# Patient Record
Sex: MALE | Race: WHITE | NOT HISPANIC OR LATINO | Employment: FULL TIME | URBAN - METROPOLITAN AREA
[De-identification: names, ages, dates, MRNs, and addresses within clinical notes are randomized per-mention and may not be internally consistent; named-entity substitution may affect disease eponyms.]

---

## 2019-01-23 ENCOUNTER — APPOINTMENT (OUTPATIENT)
Dept: RADIOLOGY | Facility: CLINIC | Age: 44
End: 2019-01-23
Payer: OTHER MISCELLANEOUS

## 2019-01-23 ENCOUNTER — APPOINTMENT (OUTPATIENT)
Dept: URGENT CARE | Facility: CLINIC | Age: 44
End: 2019-01-23
Payer: OTHER MISCELLANEOUS

## 2019-01-23 ENCOUNTER — TRANSCRIBE ORDERS (OUTPATIENT)
Dept: URGENT CARE | Facility: CLINIC | Age: 44
End: 2019-01-23
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
## 2019-01-23 PROCEDURE — 99283 EMERGENCY DEPT VISIT LOW MDM: CPT | Performed by: PHYSICIAN ASSISTANT

## 2019-01-23 PROCEDURE — 73564 X-RAY EXAM KNEE 4 OR MORE: CPT

## 2019-01-23 PROCEDURE — G0382 LEV 3 HOSP TYPE B ED VISIT: HCPCS | Performed by: PHYSICIAN ASSISTANT

## 2019-01-23 PROCEDURE — 72100 X-RAY EXAM L-S SPINE 2/3 VWS: CPT

## 2019-01-23 PROCEDURE — 73610 X-RAY EXAM OF ANKLE: CPT

## 2019-01-23 PROCEDURE — 73630 X-RAY EXAM OF FOOT: CPT

## 2019-01-26 ENCOUNTER — APPOINTMENT (OUTPATIENT)
Dept: URGENT CARE | Facility: CLINIC | Age: 44
End: 2019-01-26
Payer: OTHER MISCELLANEOUS

## 2019-01-26 PROCEDURE — 99213 OFFICE O/P EST LOW 20 MIN: CPT | Performed by: PHYSICIAN ASSISTANT

## 2019-02-04 ENCOUNTER — APPOINTMENT (OUTPATIENT)
Dept: URGENT CARE | Facility: CLINIC | Age: 44
End: 2019-02-04
Payer: OTHER MISCELLANEOUS

## 2019-02-04 PROCEDURE — 99213 OFFICE O/P EST LOW 20 MIN: CPT | Performed by: EMERGENCY MEDICINE

## 2019-02-07 ENCOUNTER — OFFICE VISIT (OUTPATIENT)
Dept: FAMILY MEDICINE CLINIC | Facility: CLINIC | Age: 44
End: 2019-02-07
Payer: COMMERCIAL

## 2019-02-07 VITALS
WEIGHT: 228 LBS | BODY MASS INDEX: 30.22 KG/M2 | OXYGEN SATURATION: 96 % | DIASTOLIC BLOOD PRESSURE: 80 MMHG | SYSTOLIC BLOOD PRESSURE: 120 MMHG | HEIGHT: 73 IN | HEART RATE: 95 BPM

## 2019-02-07 DIAGNOSIS — R22.2 MASS ON BACK: ICD-10-CM

## 2019-02-07 DIAGNOSIS — Z23 NEEDS FLU SHOT: Primary | ICD-10-CM

## 2019-02-07 DIAGNOSIS — M54.5 ACUTE LOW BACK PAIN, UNSPECIFIED BACK PAIN LATERALITY, WITH SCIATICA PRESENCE UNSPECIFIED: ICD-10-CM

## 2019-02-07 DIAGNOSIS — Z00.00 WELLNESS EXAMINATION: ICD-10-CM

## 2019-02-07 PROCEDURE — 99203 OFFICE O/P NEW LOW 30 MIN: CPT | Performed by: NURSE PRACTITIONER

## 2019-02-07 PROCEDURE — 1036F TOBACCO NON-USER: CPT | Performed by: NURSE PRACTITIONER

## 2019-02-07 PROCEDURE — 3008F BODY MASS INDEX DOCD: CPT | Performed by: NURSE PRACTITIONER

## 2019-02-07 RX ORDER — CYCLOBENZAPRINE HCL 5 MG
10 TABLET ORAL 3 TIMES DAILY PRN
COMMUNITY
End: 2019-09-11

## 2019-02-07 NOTE — PROGRESS NOTES
Assessment/Plan:       Diagnoses and all orders for this visit:    Needs flu shot  -     MULTI-DOSE VIAL: influenza vaccine, [id number], quadrivalent, 0 5 mL (AFLURIA, FLULAVAL, FLUZONE)    Acute low back pain, unspecified back pain laterality, with sciatica presence unspecified    Mass on back  -     Ambulatory referral to General Surgery; Future    Wellness examination  -     Comprehensive metabolic panel; Future  -     Lipid panel; Future    Other orders  -     cyclobenzaprine (FLEXERIL) 5 mg tablet; Take 10 mg by mouth 3 (three) times a day as needed for muscle spasms      MDM: will refer to general surgery for evaluation of cysts/masses on back  Pt would like to stay more local - referral placed for [facility]  Screening blood work ordered  Subjective:      Patient ID: Shane Ronquillo is a 40 y o  male  Here today as a new patient to establish care  Denies any overt medical issues - states of recent low back injury with work  Is being seeing by Dr Jenny Sandoval for work comp  Reports cysts/masses on his lower back region which have been there for some time but cause intermittent pain  Reports pain or "twinges" at intervals which is becoming more uncomfortable  The following portions of the patient's history were reviewed and updated as appropriate: allergies, current medications, past family history, past medical history, past social history, past surgical history and problem list     Review of Systems   Constitutional: Negative  Respiratory: Negative  Cardiovascular: Negative  Musculoskeletal: Positive for back pain  See HPI         Objective:      /80 (BP Location: Left arm, Patient Position: Sitting, Cuff Size: Standard)   Pulse 95   Ht 6' 1" (1 854 m)   Wt 103 kg (228 lb)   SpO2 96%   BMI 30 08 kg/m²          Physical Exam   Constitutional: He is oriented to person, place, and time  He appears well-developed and well-nourished     Cardiovascular: Normal rate and regular rhythm  Exam reveals no gallop and no friction rub  No murmur heard  Pulmonary/Chest: Effort normal and breath sounds normal  No respiratory distress  He has no wheezes  He has no rales  Musculoskeletal:        Back:    Neurological: He is alert and oriented to person, place, and time  Vitals reviewed  Both masses on low back region are approximately 0 25 cm under skin, not visible when viewing the low back but are easily palpated with light pressure  I have spent 20 minutes with Patient  today in which greater than 30% of this time was spent in counseling/coordination of care regarding Intructions for management and Impressions

## 2019-09-11 ENCOUNTER — OFFICE VISIT (OUTPATIENT)
Dept: URGENT CARE | Facility: CLINIC | Age: 44
End: 2019-09-11
Payer: COMMERCIAL

## 2019-09-11 VITALS
HEART RATE: 88 BPM | WEIGHT: 227.07 LBS | RESPIRATION RATE: 18 BRPM | DIASTOLIC BLOOD PRESSURE: 88 MMHG | SYSTOLIC BLOOD PRESSURE: 133 MMHG | TEMPERATURE: 98.7 F | HEIGHT: 73 IN | BODY MASS INDEX: 30.09 KG/M2 | OXYGEN SATURATION: 98 %

## 2019-09-11 DIAGNOSIS — M54.50 ACUTE RIGHT-SIDED LOW BACK PAIN WITHOUT SCIATICA: Primary | ICD-10-CM

## 2019-09-11 DIAGNOSIS — L30.9 DERMATITIS: ICD-10-CM

## 2019-09-11 DIAGNOSIS — K21.9 GASTROESOPHAGEAL REFLUX DISEASE, ESOPHAGITIS PRESENCE NOT SPECIFIED: ICD-10-CM

## 2019-09-11 LAB
SL AMB  POCT GLUCOSE, UA: NEGATIVE
SL AMB LEUKOCYTE ESTERASE,UA: NEGATIVE
SL AMB POCT BILIRUBIN,UA: NEGATIVE
SL AMB POCT BLOOD,UA: NEGATIVE
SL AMB POCT CLARITY,UA: CLEAR
SL AMB POCT COLOR,UA: YELLOW
SL AMB POCT KETONES,UA: NEGATIVE
SL AMB POCT NITRITE,UA: NEGATIVE
SL AMB POCT PH,UA: 7
SL AMB POCT SPECIFIC GRAVITY,UA: 1.02
SL AMB POCT URINE PROTEIN: NEGATIVE
SL AMB POCT UROBILINOGEN: NEGATIVE

## 2019-09-11 PROCEDURE — 81002 URINALYSIS NONAUTO W/O SCOPE: CPT | Performed by: EMERGENCY MEDICINE

## 2019-09-11 RX ORDER — PREDNISONE 10 MG/1
TABLET ORAL
Qty: 27 TABLET | Refills: 0 | Status: SHIPPED | OUTPATIENT
Start: 2019-09-11 | End: 2020-01-04

## 2019-09-11 RX ORDER — OMEPRAZOLE 40 MG/1
40 CAPSULE, DELAYED RELEASE ORAL DAILY
Qty: 21 CAPSULE | Refills: 0 | Status: SHIPPED | OUTPATIENT
Start: 2019-09-11 | End: 2020-01-04

## 2019-09-11 NOTE — PROGRESS NOTES
[facility]        NAME: Linden Ernandez is a 40 y o  male  : 1975    MRN: [id number]  DATE: 2019  TIME: 8:18 PM    Assessment and Plan   Acute right-sided low back pain without sciatica [M54 5]  1  Acute right-sided low back pain without sciatica  POCT urine dip    predniSONE 10 mg tablet   2  Gastroesophageal reflux disease, esophagitis presence not specified  omeprazole (PriLOSEC) 40 MG capsule   3  Dermatitis  predniSONE 10 mg tablet     I offered to send patient to a gastroenterologist for further evaluation of his abdominal pain but he wants to try GR D and medication 1st     Patient Instructions     Patient Instructions     Crucifix stretch (or sometimes referred to as crocodile stretch or iron cross), as demonstrated, on awakening and while still in bed, then activate your deep core muscles by imagining that you are pinning your navel to your spine and hold for reps of 10 seconds as discussed  Core bracing as demonstrated  Squat stretch as demonstrated  Hanging traction as discussed  [employer] up as discussed  Bird Dog Core exercise as discussed  Glute bridge as discussed  Side Bridge s discussed  Use heat 10 - 15 minutes every 2 - 3 hrs especially before stretching, but may use ice for reducing inflammation  Hold any NSAIDs like Ibuprofen (Advil), Naprosyn (Aleve), etc while on steroids like Medrol or Prednisone  Do not stretch by bending at your waist and touching toes, as this type of stretch could injury year back further  Also  in the future do not do any sit-ups or crutches as this could reinjure your back  Watch YouTube video called Maria Esther Pryor 11    Back Pain   WHAT YOU NEED TO KNOW:   Back pain is common  It can be caused by many conditions, such as arthritis or the breakdown of spinal discs  Your risk for back pain is increased by injuries, lack of activity, or repeated bending and twisting  You may feel sore or stiff on one or both sides of your back  The pain may spread to your buttocks or thighs  DISCHARGE INSTRUCTIONS:   Medicines:   · NSAIDs  help decrease swelling and pain  This medicine is available with or without a doctor's order  NSAIDs can cause stomach bleeding or kidney problems in certain people  If you take blood thinner medicine, always ask your healthcare provider if NSAIDs are safe for you  Always read the medicine label and follow directions  · Acetaminophen  decreases pain  It is available without a doctor's order  Ask how much to take and how often to take it  Follow directions  Acetaminophen can cause liver damage if not taken correctly  · Prescription pain medicine  may be given  Ask your healthcare provider how to take this medicine safely  · Take your medicine as directed  Contact your healthcare provider if you think your medicine is not helping or if you have side effects  Tell him or her if you are allergic to any medicine  Keep a list of the medicines, vitamins, and herbs you take  Include the amounts, and when and why you take them  Bring the list or the pill bottles to follow-up visits  Carry your medicine list with you in case of an emergency  Follow up with your healthcare provider in 2 weeks, or as directed:  Write down your questions so you remember to ask them during your visits  How to manage your back pain:   · Apply ice  on your back or affected area for 15 to 20 minutes every hour or as directed  Use an ice pack, or put crushed ice in a plastic bag  Cover it with a towel  Ice helps prevent tissue damage and decreases pain  · Apply heat  on your back or affected area for 20 to 30 minutes every 2 hours for as many days as directed  Heat helps decrease pain and muscle spasms  · Stay active  as much as you can without causing more pain  Bed rest could make your back pain worse  Avoid heavy lifting until your pain is gone    Return to the emergency department if:   · You have pain, numbness, or weakness in one or both legs  · Your pain becomes so severe that you cannot walk  · You cannot control your urine or bowel movements  · You have severe back pain with chest pain  · You have severe back pain, nausea, and vomiting  · You have severe back pain that spreads to your side or genital area  Contact your healthcare provider if:   · You have back pain that does not get better with rest and pain medicine  · You have a fever  · You have pain that worsens when you are on your back or when you rest     · You have pain that worsens when you cough or sneeze  · You lose weight without trying  · You have questions or concerns about your condition or care  © 2017 [employer] Information is for End User's use only and may not be sold, redistributed or otherwise used for commercial purposes  All illustrations and images included in CareNotes® are the copyrighted property of [employer]  or [employer]  The above information is an  only  It is not intended as medical advice for individual conditions or treatments  Talk to your doctor, nurse or pharmacist before following any medical regimen to see if it is safe and effective for you  Use ice pack or cold compresses to avoid scratching  Take an H1 antihistamine like Benadryl or non-sedating antihistamine like Zyrtec, Allegra or Claritin, and an H 2 antihistamine like Pepcid or Zantac for itch  Hold any NSAIDs like Ibuprofen (Advil), Naprosyn (Aleve), etc while on steroids like Medrol or Prednisone  Use Calomine only, not Calodryl as discussed  Acute Rash   WHAT YOU NEED TO KNOW:   A rash is irritation, redness, or itchiness in the skin or mucus membranes  Mucus membranes are areas such as the lining of your nose or throat  Acute means the rash starts suddenly, worsens quickly, and lasts a short time  An acute rash may be caused by a disease, such as hepatitis or vasculitis   The rash may be a reaction to something you are allergic to, such as certain foods, or latex  Certain medicines, including antibiotics, NSAIDs, prescription pain medicine, and aspirin can also cause a rash  DISCHARGE INSTRUCTIONS:   Return to the emergency department if:   · You have sudden trouble breathing or chest pain  · You are vomiting, have a headache or muscle aches, and your throat hurts  Contact your healthcare provider if:   · You have a fever  · You get open wounds from scratching your skin, or you have a wound that is red, swollen, or painful  · Your rash lasts longer than 3 months  · You have swelling or pain in your joints  · You have questions or concerns about your condition or care  Medicines:  If your rash does not go away on its own, you may need the following medicines:  · Antihistamines  may be given to help decrease itching  · Steroids  may be given to decrease inflammation  · Antibiotics  help fight or prevent a bacterial infection  · Take your medicine as directed  Contact your healthcare provider if you think your medicine is not helping or if you have side effects  Tell him of her if you are allergic to any medicine  Keep a list of the medicines, vitamins, and herbs you take  Include the amounts, and when and why you take them  Bring the list or the pill bottles to follow-up visits  Carry your medicine list with you in case of an emergency  Prevent a rash or care for your skin when you have a rash:  Dry skin can lead to more problems  Do not scratch your skin if it itches  You may cause a skin infection by scratching  The following may prevent dry skin, and help your skin look better:  · Use thick cream lotions or petroleum jelly to help soothe your rash  These products work well on areas with thick skin, such as your feet  Cool compresses may also be used to soothe your skin  Apply a cool compress or a cool, wet towel, and then cover it with a dry towel       · Use lukewarm water when you bathe  Hot water may damage your skin more  Pat your skin dry  Do not rub your skin with a towel  · Use detergents, soaps, shampoos, and bubble baths made for sensitive skin  Wear clothes that are made of cotton instead of nylon or wool  Cotton is softer, so it will not hurt your skin as much  Follow up with your healthcare provider as directed: You may need to see a dermatologist if healthcare providers do not know what is causing your rash  You may also need to see a dermatologist if your rash does not get better even with treatment  You may need to see a dietitian if you have allergies to foods  Write down your questions so you remember to ask them during your visits  © 2017 [employer]  Information is for End User's use only and may not be sold, redistributed or otherwise used for commercial purposes  All illustrations and images included in CareNotes® are the copyrighted property of [employer]  or [employer]  The above information is an  only  It is not intended as medical advice for individual conditions or treatments  Talk to your doctor, nurse or pharmacist before following any medical regimen to see if it is safe and effective for you  Gastroesophageal Reflux Disease   WHAT YOU NEED TO KNOW:   Gastroesophageal reflux occurs when acid and food in the stomach back up into the esophagus  Gastroesophageal reflux disease (GERD) is reflux that occurs more than twice a week for a few weeks  It usually causes heartburn and other symptoms  GERD can cause other health problems over time if it is not treated  DISCHARGE INSTRUCTIONS:   Return to the emergency department if:   · You feel full and cannot burp or vomit  · You have severe chest pain and sudden trouble breathing  · Your bowel movements are black, bloody, or tarry-looking  · Your vomit looks like coffee grounds or has blood in it    Contact your healthcare provider if:   · You vomit large amounts, or you vomit often  · You have trouble breathing after you vomit  · You have trouble swallowing, or pain with swallowing  · You are losing weight without trying  · Your symptoms get worse or do not improve with treatment  · You have questions or concerns about your condition or care  Medicines:   · Medicines  are used to decrease stomach acid  Medicine may also be used to help your lower esophageal sphincter and stomach contract (tighten) more  · Take your medicine as directed  Contact your healthcare provider if you think your medicine is not helping or if you have side effects  Tell him of her if you are allergic to any medicine  Keep a list of the medicines, vitamins, and herbs you take  Include the amounts, and when and why you take them  Bring the list or the pill bottles to follow-up visits  Carry your medicine list with you in case of an emergency  Manage GERD:   · Do not have foods or drinks that may increase heartburn  These include chocolate, peppermint, fried or fatty foods, drinks that contain caffeine, or carbonated drinks (soda)  Other foods include spicy foods, onions, tomatoes, and tomato-based foods  Do not have foods or drinks that can irritate your esophagus, such as citrus fruits, juices, and alcohol  · Do not eat large meals  When you eat a lot of food at one time, your stomach needs more acid to digest it  Eat 6 small meals each day instead of 3 large ones, and eat slowly  Do not eat meals 2 to 3 hours before bedtime  · Elevate the head of your bed  Place 6-inch blocks under the head of your bed frame  You may also use more than one pillow under your head and shoulders while you sleep  · Maintain a healthy weight  If you are overweight, weight loss may help relieve symptoms of GERD  · Do not smoke  Smoking weakens the lower esophageal sphincter and increases the risk of GERD   Ask your healthcare provider for information if you currently smoke and need help to quit  E-cigarettes or smokeless tobacco still contain nicotine  Talk to your healthcare provider before you use these products  · Do not wear clothing that is tight around your waist   Tight clothing can put pressure on your stomach and cause or worsen GERD symptoms  Follow up with your healthcare provider as directed:  Write down your questions so you remember to ask them during your visits  © 2017 [employer] Information is for End User's use only and may not be sold, redistributed or otherwise used for commercial purposes  All illustrations and images included in CareNotes® are the copyrighted property of [employer]  or [employer]  The above information is an  only  It is not intended as medical advice for individual conditions or treatments  Talk to your doctor, nurse or pharmacist before following any medical regimen to see if it is safe and effective for you  Abdominal Pain   WHAT YOU NEED TO KNOW:   Abdominal pain can be dull, achy, or sharp  You may have pain in one area of your abdomen, or in your entire abdomen  Your pain may be caused by a condition such as constipation, food sensitivity or poisoning, infection, or a blockage  Abdominal pain can also be from a hernia, appendicitis, or an ulcer  Liver, gallbladder, or kidney conditions can also cause abdominal pain  The cause of your abdominal pain may be unknown  DISCHARGE INSTRUCTIONS:   Return to the emergency department if:   · You have new chest pain or shortness of breath  · You have pulsing pain in your upper abdomen or lower back that suddenly becomes constant  · Your pain is in the right lower abdominal area and worsens with movement  · You have a fever over 100 4°F (38°C) or shaking chills  · You are vomiting and cannot keep food or liquids down  · Your pain does not improve or gets worse over the next 8 to 12 hours       · You see blood in your vomit or bowel movements, or they look black and tarry  · Your skin or the whites of your eyes turn yellow  · You are a woman and have a large amount of vaginal bleeding that is not your monthly period  Contact your healthcare provider if:   · You have pain in your lower back  · You are a man and have pain in your testicles  · You have pain when you urinate  · You have questions or concerns about your condition or care  Follow up with your healthcare provider within 24 hours or as directed:  Write down your questions so you remember to ask them during your visits  Medicines:   · Medicines  may be given to calm your stomach and prevent vomiting or to decrease pain  Ask how to take pain medicine safely  · Take your medicine as directed  Contact your healthcare provider if you think your medicine is not helping or if you have side effects  Tell him of her if you are allergic to any medicine  Keep a list of the medicines, vitamins, and herbs you take  Include the amounts, and when and why you take them  Bring the list or the pill bottles to follow-up visits  Carry your medicine list with you in case of an emergency  © 2017 [address] [facility] Information is for End User's use only and may not be sold, redistributed or otherwise used for commercial purposes  All illustrations and images included in CareNotes® are the copyrighted property of [employer]  or [employer]  The above information is an  only  It is not intended as medical advice for individual conditions or treatments  Talk to your doctor, nurse or pharmacist before following any medical regimen to see if it is safe and effective for you  Follow up with PCP in 3-5 days  Proceed to  ER if symptoms worsen  Chief Complaint     Chief Complaint   Patient presents with    Abdominal Pain     lower abdominal pain x1 month with alot of heartburn   Also c/o right flank pain    Rash     rash that blisters then breaks open at times and drains clear fluid  Itchy all over body         History of Present Illness       Patient complains of dull midepigastric pain for the past 1 month  Pain is nonradiating, not associated with shortness of breath palpitations or diaphoresis  Pain occurs at night and is worse in the morning  He has tried Pepcid with partial symptomatic relief  He also complains of a pruritic rash of arms legs and trunk for the past several weeks for which he has tried Benadryl and calamine lotion  No other family members have rashes  He also complains of right flank pain for the past week onset after heavy lifting  Pain is not radiating  He denies history of kidney stone  He denies any hematuria  Review of Systems   Review of Systems   Constitutional: Negative for appetite change, chills, fever and unexpected weight change  Respiratory: Negative for cough, shortness of breath and wheezing  Cardiovascular: Negative for chest pain and palpitations  Gastrointestinal: Positive for abdominal pain  Negative for abdominal distention, anal bleeding, blood in stool, constipation, diarrhea, nausea, rectal pain and vomiting  Genitourinary: Negative for flank pain  Musculoskeletal: Positive for back pain  Negative for arthralgias, joint swelling, myalgias and neck stiffness  Skin: Positive for rash  Negative for pallor  Neurological: Negative for syncope, light-headedness and headaches           Current Medications       Current Outpatient Medications:     omeprazole (PriLOSEC) 40 MG capsule, Take 1 capsule (40 mg total) by mouth daily for 21 days, Disp: 21 capsule, Rfl: 0    predniSONE 10 mg tablet, Take once daily all days pills on this schedule 6- 6- 5- 4- 3- 2- 1, Disp: 27 tablet, Rfl: 0    Current Allergies     Allergies as of 09/11/2019    (No Known Allergies)            The following portions of the patient's history were reviewed and updated as appropriate: allergies, current medications, past family history, past medical history, past social history, past surgical history and problem list      Past Medical History:   Diagnosis Date    Known health problems: none        Past Surgical History:   Procedure Laterality Date    NO PAST SURGERIES         Family History   Problem Relation Age of Onset    Diabetes Mother     Heart disease Mother     Heart disease Father     Kidney disease Father     Cirrhosis Father          Medications have been verified  Objective   /88   Pulse 88   Temp 98 7 °F (37 1 °C) (Tympanic)   Resp 18   Ht 6' 1" (1 854 m)   Wt 103 kg (227 lb 1 2 oz)   SpO2 98%   BMI 29 96 kg/m²        Physical Exam     Physical Exam   Constitutional: He is oriented to person, place, and time  He appears well-developed and well-nourished  No distress  HENT:   Head: Normocephalic and atraumatic  Neck: Neck supple  Cardiovascular: Normal rate, regular rhythm and normal heart sounds  Pulmonary/Chest: Effort normal and breath sounds normal    Abdominal: Soft  Bowel sounds are normal  He exhibits no distension and no mass  There is tenderness in the epigastric area  There is no rebound and no guarding  Mild tenderness midepigastric region, no rebound or guarding  No CVA tenderness  Musculoskeletal: Normal range of motion  He exhibits no tenderness or deformity  Tender paravertebral muscles right lumbar region with full reproduction of pain  Neurological: He is alert and oriented to person, place, and time  He has normal reflexes  Skin: Skin is warm and dry  Rash noted  He is not diaphoretic  No erythema  No pallor  Few scattered erythematous papules over extremities and trunk  Psychiatric: He has a normal mood and affect  His behavior is normal  Judgment and thought content normal    Nursing note and vitals reviewed

## 2019-09-11 NOTE — PATIENT INSTRUCTIONS
Crucifix stretch (or sometimes referred to as crocodile stretch or iron cross), as demonstrated, on awakening and while still in bed, then activate your deep core muscles by imagining that you are pinning your navel to your spine and hold for reps of 10 seconds as discussed  Core bracing as demonstrated  Squat stretch as demonstrated  Hanging traction as discussed  [employer] up as discussed  Bird Dog Core exercise as discussed  Glute bridge as discussed  Side Bridge s discussed  Use heat 10 - 15 minutes every 2 - 3 hrs especially before stretching, but may use ice for reducing inflammation  Hold any NSAIDs like Ibuprofen (Advil), Naprosyn (Aleve), etc while on steroids like Medrol or Prednisone  Do not stretch by bending at your waist and touching toes, as this type of stretch could injury year back further  Also  in the future do not do any sit-ups or crutches as this could reinjure your back  Watch [employer] video called Maria Esther Pryor 11    Back Pain   WHAT YOU NEED TO KNOW:   Back pain is common  It can be caused by many conditions, such as arthritis or the breakdown of spinal discs  Your risk for back pain is increased by injuries, lack of activity, or repeated bending and twisting  You may feel sore or stiff on one or both sides of your back  The pain may spread to your buttocks or thighs  DISCHARGE INSTRUCTIONS:   Medicines:   · NSAIDs  help decrease swelling and pain  This medicine is available with or without a doctor's order  NSAIDs can cause stomach bleeding or kidney problems in certain people  If you take blood thinner medicine, always ask your healthcare provider if NSAIDs are safe for you  Always read the medicine label and follow directions  · Acetaminophen  decreases pain  It is available without a doctor's order  Ask how much to take and how often to take it  Follow directions  Acetaminophen can cause liver damage if not taken correctly      · Prescription pain medicine  may be given  Ask your healthcare provider how to take this medicine safely  · Take your medicine as directed  Contact your healthcare provider if you think your medicine is not helping or if you have side effects  Tell him or her if you are allergic to any medicine  Keep a list of the medicines, vitamins, and herbs you take  Include the amounts, and when and why you take them  Bring the list or the pill bottles to follow-up visits  Carry your medicine list with you in case of an emergency  Follow up with your healthcare provider in 2 weeks, or as directed:  Write down your questions so you remember to ask them during your visits  How to manage your back pain:   · Apply ice  on your back or affected area for 15 to 20 minutes every hour or as directed  Use an ice pack, or put crushed ice in a plastic bag  Cover it with a towel  Ice helps prevent tissue damage and decreases pain  · Apply heat  on your back or affected area for 20 to 30 minutes every 2 hours for as many days as directed  Heat helps decrease pain and muscle spasms  · Stay active  as much as you can without causing more pain  Bed rest could make your back pain worse  Avoid heavy lifting until your pain is gone  Return to the emergency department if:   · You have pain, numbness, or weakness in one or both legs  · Your pain becomes so severe that you cannot walk  · You cannot control your urine or bowel movements  · You have severe back pain with chest pain  · You have severe back pain, nausea, and vomiting  · You have severe back pain that spreads to your side or genital area  Contact your healthcare provider if:   · You have back pain that does not get better with rest and pain medicine  · You have a fever  · You have pain that worsens when you are on your back or when you rest     · You have pain that worsens when you cough or sneeze  · You lose weight without trying      · You have questions or concerns about your condition or care  © 2017 [facility] Information is for End User's use only and may not be sold, redistributed or otherwise used for commercial purposes  All illustrations and images included in CareNotes® are the copyrighted property of [employer]  or [employer]  The above information is an  only  It is not intended as medical advice for individual conditions or treatments  Talk to your doctor, nurse or pharmacist before following any medical regimen to see if it is safe and effective for you  Use ice pack or cold compresses to avoid scratching  Take an H1 antihistamine like Benadryl or non-sedating antihistamine like Zyrtec, Allegra or Claritin, and an H 2 antihistamine like Pepcid or Zantac for itch  Hold any NSAIDs like Ibuprofen (Advil), Naprosyn (Aleve), etc while on steroids like Medrol or Prednisone  Use Calomine only, not Calodryl as discussed  Acute Rash   WHAT YOU NEED TO KNOW:   A rash is irritation, redness, or itchiness in the skin or mucus membranes  Mucus membranes are areas such as the lining of your nose or throat  Acute means the rash starts suddenly, worsens quickly, and lasts a short time  An acute rash may be caused by a disease, such as hepatitis or vasculitis  The rash may be a reaction to something you are allergic to, such as certain foods, or latex  Certain medicines, including antibiotics, NSAIDs, prescription pain medicine, and aspirin can also cause a rash  DISCHARGE INSTRUCTIONS:   Return to the emergency department if:   · You have sudden trouble breathing or chest pain  · You are vomiting, have a headache or muscle aches, and your throat hurts  Contact your healthcare provider if:   · You have a fever  · You get open wounds from scratching your skin, or you have a wound that is red, swollen, or painful  · Your rash lasts longer than 3 months  · You have swelling or pain in your joints       · You have questions or concerns about your condition or care  Medicines:  If your rash does not go away on its own, you may need the following medicines:  · Antihistamines  may be given to help decrease itching  · Steroids  may be given to decrease inflammation  · Antibiotics  help fight or prevent a bacterial infection  · Take your medicine as directed  Contact your healthcare provider if you think your medicine is not helping or if you have side effects  Tell him of her if you are allergic to any medicine  Keep a list of the medicines, vitamins, and herbs you take  Include the amounts, and when and why you take them  Bring the list or the pill bottles to follow-up visits  Carry your medicine list with you in case of an emergency  Prevent a rash or care for your skin when you have a rash:  Dry skin can lead to more problems  Do not scratch your skin if it itches  You may cause a skin infection by scratching  The following may prevent dry skin, and help your skin look better:  · Use thick cream lotions or petroleum jelly to help soothe your rash  These products work well on areas with thick skin, such as your feet  Cool compresses may also be used to soothe your skin  Apply a cool compress or a cool, wet towel, and then cover it with a dry towel  · Use lukewarm water when you bathe  Hot water may damage your skin more  Pat your skin dry  Do not rub your skin with a towel  · Use detergents, soaps, shampoos, and bubble baths made for sensitive skin  Wear clothes that are made of cotton instead of nylon or wool  Cotton is softer, so it will not hurt your skin as much  Follow up with your healthcare provider as directed: You may need to see a dermatologist if healthcare providers do not know what is causing your rash  You may also need to see a dermatologist if your rash does not get better even with treatment  You may need to see a dietitian if you have allergies to foods   Write down your questions so you remember to ask them during your visits  © 2017 [facility] Information is for End User's use only and may not be sold, redistributed or otherwise used for commercial purposes  All illustrations and images included in CareNotes® are the copyrighted property of [employer]  or [employer]  The above information is an  only  It is not intended as medical advice for individual conditions or treatments  Talk to your doctor, nurse or pharmacist before following any medical regimen to see if it is safe and effective for you  Gastroesophageal Reflux Disease   WHAT YOU NEED TO KNOW:   Gastroesophageal reflux occurs when acid and food in the stomach back up into the esophagus  Gastroesophageal reflux disease (GERD) is reflux that occurs more than twice a week for a few weeks  It usually causes heartburn and other symptoms  GERD can cause other health problems over time if it is not treated  DISCHARGE INSTRUCTIONS:   Return to the emergency department if:   · You feel full and cannot burp or vomit  · You have severe chest pain and sudden trouble breathing  · Your bowel movements are black, bloody, or tarry-looking  · Your vomit looks like coffee grounds or has blood in it  Contact your healthcare provider if:   · You vomit large amounts, or you vomit often  · You have trouble breathing after you vomit  · You have trouble swallowing, or pain with swallowing  · You are losing weight without trying  · Your symptoms get worse or do not improve with treatment  · You have questions or concerns about your condition or care  Medicines:   · Medicines  are used to decrease stomach acid  Medicine may also be used to help your lower esophageal sphincter and stomach contract (tighten) more  · Take your medicine as directed  Contact your healthcare provider if you think your medicine is not helping or if you have side effects   Tell him of her if you are allergic to any medicine  Keep a list of the medicines, vitamins, and herbs you take  Include the amounts, and when and why you take them  Bring the list or the pill bottles to follow-up visits  Carry your medicine list with you in case of an emergency  Manage GERD:   · Do not have foods or drinks that may increase heartburn  These include chocolate, peppermint, fried or fatty foods, drinks that contain caffeine, or carbonated drinks (soda)  Other foods include spicy foods, onions, tomatoes, and tomato-based foods  Do not have foods or drinks that can irritate your esophagus, such as citrus fruits, juices, and alcohol  · Do not eat large meals  When you eat a lot of food at one time, your stomach needs more acid to digest it  Eat 6 small meals each day instead of 3 large ones, and eat slowly  Do not eat meals 2 to 3 hours before bedtime  · Elevate the head of your bed  Place 6-inch blocks under the head of your bed frame  You may also use more than one pillow under your head and shoulders while you sleep  · Maintain a healthy weight  If you are overweight, weight loss may help relieve symptoms of GERD  · Do not smoke  Smoking weakens the lower esophageal sphincter and increases the risk of GERD  Ask your healthcare provider for information if you currently smoke and need help to quit  E-cigarettes or smokeless tobacco still contain nicotine  Talk to your healthcare provider before you use these products  · Do not wear clothing that is tight around your waist   Tight clothing can put pressure on your stomach and cause or worsen GERD symptoms  Follow up with your healthcare provider as directed:  Write down your questions so you remember to ask them during your visits  © 2017 [address] [employer] Information is for End User's use only and may not be sold, redistributed or otherwise used for commercial purposes   All illustrations and images included in CareNotes® are the copyrighted property of [employer]  or [employer]  The above information is an  only  It is not intended as medical advice for individual conditions or treatments  Talk to your doctor, nurse or pharmacist before following any medical regimen to see if it is safe and effective for you  Abdominal Pain   WHAT YOU NEED TO KNOW:   Abdominal pain can be dull, achy, or sharp  You may have pain in one area of your abdomen, or in your entire abdomen  Your pain may be caused by a condition such as constipation, food sensitivity or poisoning, infection, or a blockage  Abdominal pain can also be from a hernia, appendicitis, or an ulcer  Liver, gallbladder, or kidney conditions can also cause abdominal pain  The cause of your abdominal pain may be unknown  DISCHARGE INSTRUCTIONS:   Return to the emergency department if:   · You have new chest pain or shortness of breath  · You have pulsing pain in your upper abdomen or lower back that suddenly becomes constant  · Your pain is in the right lower abdominal area and worsens with movement  · You have a fever over 100 4°F (38°C) or shaking chills  · You are vomiting and cannot keep food or liquids down  · Your pain does not improve or gets worse over the next 8 to 12 hours  · You see blood in your vomit or bowel movements, or they look black and tarry  · Your skin or the whites of your eyes turn yellow  · You are a woman and have a large amount of vaginal bleeding that is not your monthly period  Contact your healthcare provider if:   · You have pain in your lower back  · You are a man and have pain in your testicles  · You have pain when you urinate  · You have questions or concerns about your condition or care  Follow up with your healthcare provider within 24 hours or as directed:  Write down your questions so you remember to ask them during your visits     Medicines:   · Medicines  may be given to calm your stomach and prevent vomiting or to decrease pain  Ask how to take pain medicine safely  · Take your medicine as directed  Contact your healthcare provider if you think your medicine is not helping or if you have side effects  Tell him of her if you are allergic to any medicine  Keep a list of the medicines, vitamins, and herbs you take  Include the amounts, and when and why you take them  Bring the list or the pill bottles to follow-up visits  Carry your medicine list with you in case of an emergency  © 2017 [address] [employer] Information is for End User's use only and may not be sold, redistributed or otherwise used for commercial purposes  All illustrations and images included in CareNotes® are the copyrighted property of [employer]  or [employer]  The above information is an  only  It is not intended as medical advice for individual conditions or treatments  Talk to your doctor, nurse or pharmacist before following any medical regimen to see if it is safe and effective for you

## 2020-01-04 ENCOUNTER — OFFICE VISIT (OUTPATIENT)
Dept: URGENT CARE | Facility: CLINIC | Age: 45
End: 2020-01-04
Payer: COMMERCIAL

## 2020-01-04 VITALS
WEIGHT: 236 LBS | SYSTOLIC BLOOD PRESSURE: 132 MMHG | HEIGHT: 73 IN | HEART RATE: 75 BPM | RESPIRATION RATE: 18 BRPM | OXYGEN SATURATION: 96 % | DIASTOLIC BLOOD PRESSURE: 80 MMHG | BODY MASS INDEX: 31.28 KG/M2 | TEMPERATURE: 97.3 F

## 2020-01-04 DIAGNOSIS — M54.42 CHRONIC LEFT-SIDED LOW BACK PAIN WITH LEFT-SIDED SCIATICA: Primary | ICD-10-CM

## 2020-01-04 DIAGNOSIS — G89.29 CHRONIC LEFT-SIDED LOW BACK PAIN WITH LEFT-SIDED SCIATICA: Primary | ICD-10-CM

## 2020-01-04 PROCEDURE — 99213 OFFICE O/P EST LOW 20 MIN: CPT | Performed by: EMERGENCY MEDICINE

## 2020-01-04 PROCEDURE — G0382 LEV 3 HOSP TYPE B ED VISIT: HCPCS | Performed by: EMERGENCY MEDICINE

## 2020-01-04 PROCEDURE — 99283 EMERGENCY DEPT VISIT LOW MDM: CPT | Performed by: EMERGENCY MEDICINE

## 2020-01-04 RX ORDER — CYCLOBENZAPRINE HCL 10 MG
10 TABLET ORAL 3 TIMES DAILY PRN
Qty: 20 TABLET | Refills: 0 | Status: SHIPPED | OUTPATIENT
Start: 2020-01-04 | End: 2020-01-21 | Stop reason: ALTCHOICE

## 2020-01-04 RX ORDER — PREDNISONE 10 MG/1
TABLET ORAL
Qty: 27 TABLET | Refills: 0 | Status: SHIPPED | OUTPATIENT
Start: 2020-01-04 | End: 2020-01-21 | Stop reason: ALTCHOICE

## 2020-01-04 NOTE — PATIENT INSTRUCTIONS
Crucifix stretch (or sometimes referred to as crocodile stretch or iron cross), as demonstrated, on awakening and while still in bed, then activate your deep core muscles by imagining that you are pinning your navel to your spine and hold for reps of 10 seconds as discussed  Core bracing as demonstrated  Squat stretch as demonstrated  Hanging traction as discussed  [employer] up as discussed  Bird Dog Core exercise as discussed  Glute bridge as discussed  Side Bridge s discussed  Use heat 10 - 15 minutes every 2 - 3 hrs especially before stretching, but may use ice for reducing inflammation  Hold any NSAID's like Ibuprofen (Advil), Naprosyn (Aleve), etc while on steroids like Medrol or Prednisone  Do not stretch by bending at your waist and touching toes, as this type of stretch could injury year back further   Also  in the future do not do any sit-ups or crutches as this could reinjure your back  Watch [employer] video called "Kaye's Limber 11"    Sciatica   WHAT YOU NEED TO KNOW:   Sciatica is a condition that causes pain along your sciatic nerve  The sciatic nerve runs from your spine through both sides of your buttocks  It then runs down the back of your thigh, into your lower leg and foot  Your sciatic nerve may be compressed, inflamed, irritated, or stretched  DISCHARGE INSTRUCTIONS:   Medicines:   · NSAIDs:  These medicines decrease swelling and pain  NSAIDs are available without a doctor's order  Ask your healthcare provider which medicine is right for you  Ask how much to take and when to take it  Take as directed  NSAIDs can cause stomach bleeding or kidney problems if not taken correctly  · Acetaminophen: This medicine decreases pain  Acetaminophen is available without a doctor's order  Ask how much to take and when to take it  Follow directions  Acetaminophen can cause liver damage if not taken correctly  · Muscle relaxers  help decrease pain and muscle spasms      · Take your medicine as directed  Contact your healthcare provider if you think your medicine is not helping or if you have side effects  Tell him of her if you are allergic to any medicine  Keep a list of the medicines, vitamins, and herbs you take  Include the amounts, and when and why you take them  Bring the list or the pill bottles to follow-up visits  Carry your medicine list with you in case of an emergency  Follow up with your healthcare provider as directed:  Write down your questions so you remember to ask them during your visits  Manage your symptoms:   · Activity:  Decrease your activity  Do not lift heavy objects or twist your back for at least 6 weeks  Slowly return to your usual activity  · Ice:  Ice helps decrease swelling and pain  Ice may also help prevent tissue damage  Use an ice pack, or put crushed ice in a plastic bag  Cover it with a towel and place it on your low back or leg for 15 to 20 minutes every hour or as directed  · Heat:  Heat helps decrease pain and muscle spasms  Apply heat on the area for 20 to 30 minutes every 2 hours for as many days as directed  · Physical therapy:  You may need to see physical therapist to teach you exercises to help improve movement and strength, and to decrease pain  An occupational therapist teaches you skills to help with your daily activities  · Use assistive devices if directed: You may need to wear back support, such as a back brace  You may need crutches, a cane, or a walker to decrease stress on your lower back and leg muscles  Ask your healthcare provider for more information about assistive devices and how to use them correctly  Self-care:   · Avoid pressure on your back and legs:  Do not  lift heavy objects, or stand or sit for long periods of time  · Lift objects safely:  Keep your back straight and bend your knees when you  an object  Do not bend or twist your back when you lift      · Maintain a healthy weight:  Ask your healthcare provider how much you should weigh  Ask him to help you create a weight loss plan if you are overweight  · Exercise:  Ask your healthcare provider about the best stretching, warmup, and exercise plan for you  Contact your healthcare provider if:   · You have pain in your lower back at night or when resting  · You have pain in your lower back with numbness below the knee  · You have weakness in one leg only  · You have questions or concerns about your condition or care  Return to the emergency department if:   · You have trouble holding back your urine or bowel movements  · You have weakness in both legs  · You have numbness in your groin or buttocks  © 2017 [facility] Information is for End User's use only and may not be sold, redistributed or otherwise used for commercial purposes  All illustrations and images included in CareNotes® are the copyrighted property of [employer]  or [employer]  The above information is an  only  It is not intended as medical advice for individual conditions or treatments  Talk to your doctor, nurse or pharmacist before following any medical regimen to see if it is safe and effective for you

## 2020-01-04 NOTE — PROGRESS NOTES
Assessment/Plan:      Diagnoses and all orders for this visit:    Chronic left-sided low back pain with left-sided sciatica  -     predniSONE 10 mg tablet; Take once daily all days pills on this schedule 6- 6- 5- 4- 3- 2- 1  -     Ambulatory referral to Neurosurgery; Future  -     cyclobenzaprine (FLEXERIL) 10 mg tablet; Take 1 tablet (10 mg total) by mouth 3 (three) times a day as needed for muscle spasms          Subjective:     Patient ID: Alysa Vieyra is a 40 y o  male  Patient complains of flare of chronic left low back pain with radiation down left leg after minor activity several days ago  Review of Systems   Constitutional: Negative for chills and fever  Respiratory: Negative  Cardiovascular: Negative  Musculoskeletal: Positive for back pain  Skin: Negative for rash  Neurological: Negative for weakness and numbness  Objective:     Physical Exam   Constitutional: He is oriented to person, place, and time  He appears well-developed and well-nourished  No distress  Neck: Neck supple  Cardiovascular: Normal rate and regular rhythm  Pulmonary/Chest: Effort normal and breath sounds normal    Musculoskeletal: He exhibits tenderness  He exhibits no deformity  Tender paravertebral muscles and SI joint left greater than right  Lumbar flexion full painful, lumbar extension full, left right lumbar rotation full painful, straight leg raising negative  Neurological: He is alert and oriented to person, place, and time  He has normal reflexes  Skin: Skin is warm and dry  No rash noted  No erythema  Psychiatric: He has a normal mood and affect  His behavior is normal  Judgment and thought content normal    Nursing note and vitals reviewed

## 2020-01-21 ENCOUNTER — HOSPITAL ENCOUNTER (OUTPATIENT)
Dept: RADIOLOGY | Facility: HOSPITAL | Age: 45
Discharge: HOME | End: 2020-01-21
Payer: COMMERCIAL

## 2020-01-21 ENCOUNTER — TRANSCRIBE ORDERS (OUTPATIENT)
Dept: RADIOLOGY | Facility: HOSPITAL | Age: 45
End: 2020-01-21

## 2020-01-21 ENCOUNTER — CONSULT (OUTPATIENT)
Dept: NEUROSURGERY | Facility: CLINIC | Age: 45
End: 2020-01-21
Payer: COMMERCIAL

## 2020-01-21 VITALS
BODY MASS INDEX: 33 KG/M2 | SYSTOLIC BLOOD PRESSURE: 112 MMHG | HEART RATE: 74 BPM | HEIGHT: 73 IN | WEIGHT: 249 LBS | TEMPERATURE: 98.3 F | RESPIRATION RATE: 16 BRPM | DIASTOLIC BLOOD PRESSURE: 76 MMHG

## 2020-01-21 DIAGNOSIS — M54.42 CHRONIC LEFT-SIDED LOW BACK PAIN WITH LEFT-SIDED SCIATICA: ICD-10-CM

## 2020-01-21 DIAGNOSIS — G89.29 CHRONIC LEFT-SIDED LOW BACK PAIN WITH LEFT-SIDED SCIATICA: ICD-10-CM

## 2020-01-21 DIAGNOSIS — R29.898 DECONDITIONED LOW BACK: ICD-10-CM

## 2020-01-21 DIAGNOSIS — G89.4 CHRONIC PAIN SYNDROME: ICD-10-CM

## 2020-01-21 DIAGNOSIS — G89.4 CHRONIC PAIN SYNDROME: Primary | ICD-10-CM

## 2020-01-21 PROCEDURE — 72114 X-RAY EXAM L-S SPINE BENDING: CPT

## 2020-01-21 PROCEDURE — 3008F BODY MASS INDEX DOCD: CPT | Performed by: PHYSICIAN ASSISTANT

## 2020-01-21 PROCEDURE — 99203 OFFICE O/P NEW LOW 30 MIN: CPT | Performed by: PHYSICIAN ASSISTANT

## 2020-01-21 NOTE — PROGRESS NOTES
Patient ID: Elizabeth Dowd is a 39 y o  male  Diagnoses and all orders for this visit:    Chronic pain syndrome  -     MRI lumbar spine without contrast; Future  -     XR spine lumbar complete w bending minimum 6 views; Future    Chronic left-sided low back pain with left-sided sciatica  -     Ambulatory referral to Neurosurgery  -     MRI lumbar spine without contrast; Future  -     XR spine lumbar complete w bending minimum 6 views; Future    Deconditioned low back  -     MRI lumbar spine without contrast; Future  -     XR spine lumbar complete w bending minimum 6 views; Future          Assessment/Plan:    Pleasant 51-year-old male, reports a 1 year history of intermittent left-sided low back pain left hip pain left posterior leg pain  He reports in the 1st week of January this year she has a significant increase his activity and this caused an exacerbation of his left low back and left leg pain as well as left buttocks pain  He was seen by urgent care who gave him a prednisone course as well as a muscle relaxant  He reports otherwise typically events of her once a month or so a typically last 2 or 3 days are resolved spontaneously  He has had a recent course of prednisone which he reports has been helpful, he also has been taking Flexeril intermittently  Upon completion of the prednisone he started Aleve and reports taking 4-6 tablets daily which also gave him some relief  (RI did review him that the typical maximum dose of this medication (Aleve) is 2 tablets twice daily)    He denies bowel or bladder incontinence, motor or sensory difficulties in the upper lower extremities, difficulty with balance or gait, or perineal anesthesia  He reports his pain is aggravated with persistent sitting worse or recumbent  He reports improve significantly upon standing, with increased range of motion  There is no recent imaging for review      On examination today motor exam of the lower extremities is 5 x 5 for power throughout, reflexes patella and Achilles are intact and +2 and symmetric, sensation sharp, dull and vibratory is intact throughout  From a standing position he is able easily lift on his heel and toes, bending his waist to greater than 90°, seated straight leg raise is 90° bilaterally without evidence of discomfort, Raj's test is also negative bilaterally  At this  juncture flexion-extension films of the lumbar spine are requested,    As there has been no recent conservative management a course of physical therapy is advised and he understands to meet with his primary care to request/schedule  this  MRI lumbar spine is also requested to further assess the low back/lumbar spine  He is advised he may continue his ibuprofen which he has at home 800 mg 3 times daily or Aleve 2 tablets twice daily  In addition he can discuss with his primary care provider a prescription for muscle relaxants such as Flexeril or Robaxin  Further follow-up with Neurosurgery planned after completion of MRI or after completion of a course of physical therapy for further assessment  These findings, impressions  and recommendations are reviewed in great detail with the patient, he expressed understanding and agreement, his questions were answered completely and to his satisfaction  Return in about 4 weeks (around 2/18/2020) for To review MRI lumbar spine if completed       Chief Complaint  Initial consultation left low back and left buttocks pain occasional left leg pain    HPI       The following portions of the patient's history were reviewed and updated as appropriate: allergies, current medications, past family history, past medical history, past social history and past surgical history  Review of Systems   Constitutional: Negative  HENT: Negative  Eyes: Negative  Respiratory: Negative  Cardiovascular: Negative  Gastrointestinal: Negative  Endocrine: Negative  Genitourinary: Negative  Musculoskeletal: Positive for back pain (B/l LBP radiates to left groin, left anterior thigh up to left knee  He describes pain as sharp, stabbing and shooting pain x1 yr) and gait problem  Patient has tried physical therapy which provided no relief  No pain management  Takes Advil/ Aleve PRN , provides mild relief  Skin: Negative  Allergic/Immunologic: Negative  Neurological: Positive for weakness (Left leg) and numbness (and tingling on left leg)  Hematological: Negative  Psychiatric/Behavioral: Negative  All other systems reviewed and are negative  Objective:    Physical Exam   Constitutional: He is oriented to person, place, and time  He appears well-developed and well-nourished  HENT:   Head: Normocephalic and atraumatic  Eyes: Pupils are equal, round, and reactive to light  EOM are normal    Cardiovascular: Normal rate  Pulmonary/Chest: Effort normal and breath sounds normal    Neurological: He is alert and oriented to person, place, and time  Skin: Skin is warm and dry  Psychiatric: He has a normal mood and affect  Vitals reviewed  Neurologic Exam     Mental Status   Oriented to person, place, and time  Cranial Nerves     CN III, IV, VI   Pupils are equal, round, and reactive to light    Extraocular motions are normal

## 2020-01-21 NOTE — LETTER
January 21, 2020     Bari Miranda, [address] [facility] [address] [place] [address]    Patient: Aleksandr Garcia   YOB: 1975   Date of Visit: 1/21/2020       Dear Dr Richard Serna: Thank you for referring Aleksandr Garcia to me for evaluation  Below are my notes for this consultation  If you have questions, please do not hesitate to call me  I look forward to following your patient along with you  Sincerely,        Jl Will PA-C        CC: MD Jl Ritchie PA-C  1/21/2020  8:10 AM  Sign at close encounter  Patient ID: Aleksandr Garcia is a 39 y o  male  Diagnoses and all orders for this visit:    Chronic pain syndrome  -     MRI lumbar spine without contrast; Future  -     XR spine lumbar complete w bending minimum 6 views; Future    Chronic left-sided low back pain with left-sided sciatica  -     Ambulatory referral to Neurosurgery  -     MRI lumbar spine without contrast; Future  -     XR spine lumbar complete w bending minimum 6 views; Future    Deconditioned low back  -     MRI lumbar spine without contrast; Future  -     XR spine lumbar complete w bending minimum 6 views; Future          Assessment/Plan:    Pleasant 51-year-old male, reports a 1 year history of intermittent left-sided low back pain left hip pain left posterior leg pain  He reports in the 1st week of January this year she has a significant increase his activity and this caused an exacerbation of his left low back and left leg pain as well as left buttocks pain  He was seen by urgent care who gave him a prednisone course as well as a muscle relaxant  He reports otherwise typically events of her once a month or so a typically last 2 or 3 days are resolved spontaneously  He has had a recent course of prednisone which he reports has been helpful, he also has been taking Flexeril intermittently      Upon completion of the prednisone he started Aleve and reports taking 4-6 tablets daily which also gave him some relief  (RI did review him that the typical maximum dose of this medication (Aleve) is 2 tablets twice daily)    He denies bowel or bladder incontinence, motor or sensory difficulties in the upper lower extremities, difficulty with balance or gait, or perineal anesthesia  He reports his pain is aggravated with persistent sitting worse or recumbent  He reports improve significantly upon standing, with increased range of motion  There is no recent imaging for review  On examination today motor exam of the lower extremities is 5 x 5 for power throughout, reflexes patella and Achilles are intact and +2 and symmetric, sensation sharp, dull and vibratory is intact throughout  From a standing position he is able easily lift on his heel and toes, bending his waist to greater than 90°, seated straight leg raise is 90° bilaterally without evidence of discomfort, Raj's test is also negative bilaterally  At this  juncture flexion-extension films of the lumbar spine are requested,    As there has been no recent conservative management a course of physical therapy is advised and he understands to meet with his primary care to request/schedule  this  MRI lumbar spine is also requested to further assess the low back/lumbar spine  He is advised he may continue his ibuprofen which he has at home 800 mg 3 times daily or Aleve 2 tablets twice daily  In addition he can discuss with his primary care provider a prescription for muscle relaxants such as Flexeril or Robaxin  Further follow-up with Neurosurgery planned after completion of MRI or after completion of a course of physical therapy for further assessment  These findings, impressions  and recommendations are reviewed in great detail with the patient, he expressed understanding and agreement, his questions were answered completely and to his satisfaction          Return in about 4 weeks (around 2/18/2020) for To review MRI lumbar spine if completed       Chief Complaint  Initial consultation left low back and left buttocks pain occasional left leg pain    HPI       The following portions of the patient's history were reviewed and updated as appropriate: allergies, current medications, past family history, past medical history, past social history and past surgical history  Review of Systems   Constitutional: Negative  HENT: Negative  Eyes: Negative  Respiratory: Negative  Cardiovascular: Negative  Gastrointestinal: Negative  Endocrine: Negative  Genitourinary: Negative  Musculoskeletal: Positive for back pain (B/l LBP radiates to left groin, left anterior thigh up to left knee  He describes pain as sharp, stabbing and shooting pain x1 yr) and gait problem  Patient has tried physical therapy which provided no relief  No pain management  Takes Advil/ Aleve PRN , provides mild relief  Skin: Negative  Allergic/Immunologic: Negative  Neurological: Positive for weakness (Left leg) and numbness (and tingling on left leg)  Hematological: Negative  Psychiatric/Behavioral: Negative  All other systems reviewed and are negative  Objective:    Physical Exam   Constitutional: He is oriented to person, place, and time  He appears well-developed and well-nourished  HENT:   Head: Normocephalic and atraumatic  Eyes: Pupils are equal, round, and reactive to light  EOM are normal    Cardiovascular: Normal rate  Pulmonary/Chest: Effort normal and breath sounds normal    Neurological: He is alert and oriented to person, place, and time  Skin: Skin is warm and dry  Psychiatric: He has a normal mood and affect  Vitals reviewed  Neurologic Exam     Mental Status   Oriented to person, place, and time  Cranial Nerves     CN III, IV, VI   Pupils are equal, round, and reactive to light    Extraocular motions are normal

## 2020-01-21 NOTE — PATIENT INSTRUCTIONS
Meet with her primary care provider discuss a course of physical therapy  Continue with either ibuprofen 800 mg 3 times a day (patient reports he has this at home) or Aleve 2 tablets twice daily  Meet with her primary care provider discuss a muscle relaxant such as additional Flexeril, or Robaxin  Proceed for lumbar flexion-extension films as discussed  Proceed for MRI lumbar spine  Return to Neurosurgery MRI lumbar spine has been completed  Otherwise return after course of physical therapy

## 2020-01-24 ENCOUNTER — TELEPHONE (OUTPATIENT)
Dept: NEUROSURGERY | Facility: CLINIC | Age: 45
End: 2020-01-24

## 2020-01-24 NOTE — TELEPHONE ENCOUNTER
Called patient to inform him MRI denied  Informed him xray was stable per Spencer Velez and to complete 6 weeks of physical therapy  Once he has completed physical therapy if he has no improvement to call office and we will try to get MRI approved

## 2021-01-21 ENCOUNTER — OFFICE VISIT (OUTPATIENT)
Dept: URGENT CARE | Facility: CLINIC | Age: 46
End: 2021-01-21
Payer: COMMERCIAL

## 2021-01-21 VITALS
TEMPERATURE: 97.5 F | HEIGHT: 73 IN | SYSTOLIC BLOOD PRESSURE: 137 MMHG | RESPIRATION RATE: 16 BRPM | DIASTOLIC BLOOD PRESSURE: 81 MMHG | WEIGHT: 240 LBS | BODY MASS INDEX: 31.81 KG/M2 | OXYGEN SATURATION: 96 % | HEART RATE: 79 BPM

## 2021-01-21 DIAGNOSIS — L02.414 ABSCESS OF FOREARM, LEFT: Primary | ICD-10-CM

## 2021-01-21 PROCEDURE — 87070 CULTURE OTHR SPECIMN AEROBIC: CPT | Performed by: FAMILY MEDICINE

## 2021-01-21 PROCEDURE — 99203 OFFICE O/P NEW LOW 30 MIN: CPT | Performed by: FAMILY MEDICINE

## 2021-01-21 PROCEDURE — 87205 SMEAR GRAM STAIN: CPT | Performed by: FAMILY MEDICINE

## 2021-01-21 PROCEDURE — G0382 LEV 3 HOSP TYPE B ED VISIT: HCPCS | Performed by: FAMILY MEDICINE

## 2021-01-21 PROCEDURE — 87186 SC STD MICRODIL/AGAR DIL: CPT | Performed by: FAMILY MEDICINE

## 2021-01-21 PROCEDURE — 99283 EMERGENCY DEPT VISIT LOW MDM: CPT | Performed by: FAMILY MEDICINE

## 2021-01-21 PROCEDURE — 10060 I&D ABSCESS SIMPLE/SINGLE: CPT | Performed by: FAMILY MEDICINE

## 2021-01-21 RX ORDER — LIDOCAINE HYDROCHLORIDE AND EPINEPHRINE 10; 10 MG/ML; UG/ML
2 INJECTION, SOLUTION INFILTRATION; PERINEURAL ONCE
Status: DISCONTINUED | OUTPATIENT
Start: 2021-01-21 | End: 2021-01-21

## 2021-01-21 RX ORDER — SULFAMETHOXAZOLE AND TRIMETHOPRIM 800; 160 MG/1; MG/1
1 TABLET ORAL EVERY 12 HOURS SCHEDULED
Qty: 20 TABLET | Refills: 0 | Status: SHIPPED | OUTPATIENT
Start: 2021-01-21 | End: 2021-01-31

## 2021-01-21 NOTE — LETTER
January 21, 2021     Patient: Jaycee Winkler   YOB: 1975   Date of Visit: 1/21/2021       To Whom it May Concern:    Jaycee Winkler was seen in my clinic on 1/21/2021  He may return to work on 1/23  If you have any questions or concerns, please don't hesitate to call           Sincerely,          Alverto Dahl, DO        CC: No Recipients

## 2021-01-21 NOTE — PROGRESS NOTES
[id number] [email or web address]        NAME: Lin Butt is a 55 y o  male  : 1975    MRN: [id number]  DATE: 2021  TIME: 9:55 AM    Assessment and Plan   Abscess of forearm, left [L02 414]  1  Abscess of forearm, left  sulfamethoxazole-trimethoprim (BACTRIM DS) 800-160 mg per tablet         Patient Instructions     Ibuprofen for pain  Follow up with PCP in 3-5 days  Proceed to  ER if symptoms worsen  Chief Complaint     Chief Complaint   Patient presents with    Wound Infection     pt has abcess on left forearm that started 1 week ago, has been getting worse, now forearm is swollen from elbow to wrist  starting to get painful         History of Present Illness       Wound Infection (pt has abcess on left forearm that started 1 week ago, has been getting worse, now forearm is swollen from elbow to wrist  starting to get painful)      Review of Systems   Review of Systems   Constitutional: Negative  Respiratory: Negative  Cardiovascular: Negative  Skin: Positive for wound  Current Medications       Current Outpatient Medications:     sulfamethoxazole-trimethoprim (BACTRIM DS) 800-160 mg per tablet, Take 1 tablet by mouth every 12 (twelve) hours for 10 days, Disp: 20 tablet, Rfl: 0    Current Allergies     Allergies as of 2021    (No Known Allergies)            The following portions of the patient's history were reviewed and updated as appropriate: allergies, current medications, past family history, past medical history, past social history, past surgical history and problem list      Past Medical History:   Diagnosis Date    Arthritis        Past Surgical History:   Procedure Laterality Date    NO PAST SURGERIES         Family History   Problem Relation Age of Onset    Diabetes Mother     Heart disease Mother     Heart disease Father     Kidney disease Father     Cirrhosis Father          Medications have been verified          Objective   /81   Pulse 79   Temp 97 5 °F (36 4 °C)   Resp 16   Ht 6' 1" (1 854 m)   Wt 109 kg (240 lb)   SpO2 96%   BMI 31 66 kg/m²   No LMP for male patient  Physical Exam     Physical Exam  Constitutional:       Appearance: He is well-developed  Cardiovascular:      Rate and Rhythm: Normal rate and regular rhythm  Pulmonary:      Effort: Pulmonary effort is normal       Breath sounds: Normal breath sounds  Skin:           Incision and drain    Date/Time: 1/21/2021 10:04 AM  Performed by: Scott Paez DO  Authorized by: Scott Paez DO   Universal Protocol:  Consent: Verbal consent obtained  Risks and benefits: risks, benefits and alternatives were discussed  Consent given by: patient  Patient understanding: patient states understanding of the procedure being performed  Patient consent: the patient's understanding of the procedure matches consent given  Patient identity confirmed: verbally with patient      Patient location:  Clinic  Location:     Type:  Abscess    Location:  Upper extremity    Upper extremity location:  L arm  Pre-procedure details:     Skin preparation:  Betadine  Anesthesia (see MAR for exact dosages): Anesthesia method:  Local infiltration    Local anesthetic:  Lidocaine 1% WITH epi  Procedure details:     Complexity:  Simple    Needle aspiration: no      Incision types:  Stab incision    Scalpel blade:  10    Approach:  Open    Incision depth:  Subcutaneous    Drainage:  Serosanguinous    Drainage amount: Moderate    Wound treatment:  Wound left open    Packing materials:  None  Post-procedure details:     Patient tolerance of procedure:   Tolerated well, no immediate complications

## 2021-01-21 NOTE — PATIENT INSTRUCTIONS
Ibuprofen for pain  Follow up with PCP in 3-5 days  Proceed to  ER if symptoms worsen  Abscess   AMBULATORY CARE:   An abscess  is an area under the skin where pus (infected fluid) collects  An abscess is often caused by bacteria, fungi or other germs that get into an open wound  You can get an abscess anywhere on your body  Common signs and symptoms of an abscess: You may have a swollen mass that is red and painful  Pus may leak out of the mass  The pus will be white or yellow and may smell bad  You may have redness and pain days before the mass appears  You may have a fever and chills if the infection spreads  Seek immediate care if:   · The area around your abscess becomes very painful, warm, or has red streaks  · You have a fever and chills  · Your heart is beating faster than usual      · You feel faint or confused  Contact your healthcare provider if:   · Your abscess gets bigger or does not get better  · Your abscess returns  · You have questions or concerns about your condition or care  Treatment for an abscess: Your healthcare provider may need to make a cut in the abscess to allow the pus to drain  You may need surgery to remove your abscess  You may  need any of the following:  · Antibiotics  help treat a bacterial infection  · Acetaminophen  decreases pain and fever  It is available without a doctor's order  Ask how much to take and how often to take it  Follow directions  Acetaminophen can cause liver damage if not taken correctly  · NSAIDs , such as ibuprofen, help decrease swelling, pain, and fever  This medicine is available with or without a doctor's order  NSAIDs can cause stomach bleeding or kidney problems in certain people  If you take blood thinner medicine, always ask your healthcare provider if NSAIDs are safe for you  Always read the medicine label and follow directions  · Take your medicine as directed    Contact your healthcare provider if you think your medicine is not helping or if you have side effects  Tell him or her if you are allergic to any medicine  Keep a list of the medicines, vitamins, and herbs you take  Include the amounts, and when and why you take them  Bring the list or the pill bottles to follow-up visits  Carry your medicine list with you in case of an emergency  Self-care:   · Apply a warm compress to your abscess  This will help it open and drain  Wet a washcloth in warm, but not hot, water  Apply the compress for 10 minutes  Repeat this 4 times each day  Do not  press on an abscess or try to open it with a needle  You may push the bacteria deeper or into your blood  · Do not share your clothes, towels, or sheets with anyone  This can spread the infection to others  · Wash your hands often  This can help prevent the spread of germs  Use soap and water or an alcohol-based hand rub  Care for your wound after it is drained:   · Care for your wound as directed  If your healthcare provider says it is okay, carefully remove the bandage and gauze packing  You may need to soak the gauze to get it out of your wound  Clean your wound and the area around it as directed  Dry the area and put on new, clean bandages  Change your bandages when they get wet or dirty  · Ask your healthcare provider how to change the gauze in your wound  Keep track of how many pieces of gauze are placed inside the wound  Do not put too much packing in the wound  Do not pack the gauze too tightly in your wound  Follow up with your healthcare provider in 1 to 3 days: You may need to have your packing removed or your bandage changed  Write down your questions so you remember to ask them during your visits  © Copyright [address] Information is for End User's use only and may not be sold, redistributed or otherwise used for commercial purposes   All illustrations and images included in CareNotes® are the copyrighted property of [employer]  or [address] [place] [address] Edwin   The above information is an  only  It is not intended as medical advice for individual conditions or treatments  Talk to your doctor, nurse or pharmacist before following any medical regimen to see if it is safe and effective for you

## 2021-01-24 LAB
BACTERIA WND AEROBE CULT: ABNORMAL
GRAM STN SPEC: ABNORMAL

## 2021-02-26 ENCOUNTER — OFFICE VISIT (OUTPATIENT)
Dept: URGENT CARE | Facility: CLINIC | Age: 46
End: 2021-02-26
Payer: COMMERCIAL

## 2021-02-26 VITALS
OXYGEN SATURATION: 97 % | HEIGHT: 73 IN | RESPIRATION RATE: 16 BRPM | BODY MASS INDEX: 31.14 KG/M2 | WEIGHT: 235 LBS | HEART RATE: 98 BPM | TEMPERATURE: 97.5 F

## 2021-02-26 DIAGNOSIS — R68.89 FLU-LIKE SYMPTOMS: Primary | ICD-10-CM

## 2021-02-26 PROCEDURE — G0383 LEV 4 HOSP TYPE B ED VISIT: HCPCS | Performed by: EMERGENCY MEDICINE

## 2021-02-26 PROCEDURE — 99284 EMERGENCY DEPT VISIT MOD MDM: CPT | Performed by: EMERGENCY MEDICINE

## 2021-02-26 PROCEDURE — U0003 INFECTIOUS AGENT DETECTION BY NUCLEIC ACID (DNA OR RNA); SEVERE ACUTE RESPIRATORY SYNDROME CORONAVIRUS 2 (SARS-COV-2) (CORONAVIRUS DISEASE [COVID-19]), AMPLIFIED PROBE TECHNIQUE, MAKING USE OF HIGH THROUGHPUT TECHNOLOGIES AS DESCRIBED BY CMS-2020-01-R: HCPCS | Performed by: EMERGENCY MEDICINE

## 2021-02-26 PROCEDURE — U0005 INFEC AGEN DETEC AMPLI PROBE: HCPCS | Performed by: EMERGENCY MEDICINE

## 2021-02-26 PROCEDURE — 99214 OFFICE O/P EST MOD 30 MIN: CPT | Performed by: EMERGENCY MEDICINE

## 2021-02-26 RX ORDER — ALBUTEROL SULFATE 90 UG/1
2 AEROSOL, METERED RESPIRATORY (INHALATION) EVERY 6 HOURS PRN
Qty: 8.5 G | Refills: 0 | Status: SHIPPED | OUTPATIENT
Start: 2021-02-26

## 2021-02-26 NOTE — PATIENT INSTRUCTIONS
You have been diagnosed with a flu-like illness, and your symptoms should resolve over the next 7 to 10 days with the treatments recommended today  If they do not, it is possible that you have developed a bacterial infection and you should return  If you were to take an antibiotic while you are still in the viral stage, you will not get better any faster, but could kill off  good germs in your body as well as make germs  resistant to the antibiotic  Take an expectorant - guaifenesin should be the only ingredient - during the day, and the cough suppressant (ex  Robitussin DM or Tessalon) if needed at night only  Take Zinc 12 5 to 15 mg every 2 - 3 hrs while awake for the next few days  You may take Cold Ulises (13 3 mg of Zinc) or split a 25 mg Zinc tablet or lozenge in two or a 50 mg into four to get the proper dose  The total daily dose of Zinc should exceed 75 mg per day  You may also take vitamin D 3 2000 i u s per day for the next 1 week  You may also take a decongestant like Sudafed, unless you have hypertension or cardiac disease  You may take Imodium for diarrhea according to package instructions  Flu-like illness   AMBULATORY CARE:   Flu-like illness is an infection caused by a virus  The flu is easily spread when an infected person coughs, sneezes, or has close contact with others  You may be able to spread the flu to others for 1 week or longer after signs or symptoms appear  Common signs and symptoms include the following:   · Fever and chills    · Headaches, body aches, and muscle or joint pain    · Cough, runny nose, and sore throat    · Loss of appetite, nausea, vomiting, or diarrhea    · Tiredness    · Trouble breathing  Call 911 for any of the following:   · You have trouble breathing, and your lips look purple or blue  · You have a seizure  Seek care immediately if:   · You are dizzy, or you are urinating less or not at all       · You have a headache with a stiff neck, and you feel tired or confused  · You have new pain or pressure in your chest     · Your symptoms, such as shortness of breath, vomiting, or diarrhea, get worse  · Your symptoms, such as fever and coughing, seem to get better, but then get worse  Contact your healthcare provider if:   · You have new muscle pain or weakness  · You have questions or concerns about your condition or care  Treatment for influenza  may include any of the following:  · Acetaminophen  decreases pain and fever  It is available without a doctor's order  Ask how much to take and how often to take it  Follow directions  Acetaminophen can cause liver damage if not taken correctly  · NSAIDs , such as ibuprofen, help decrease swelling, pain, and fever  This medicine is available with or without a doctor's order  NSAIDs can cause stomach bleeding or kidney problems in certain people  If you take blood thinner medicine, always ask your healthcare provider if NSAIDs are safe for you  Always read the medicine label and follow directions  · Antivirals  help fight a viral infection  Manage your symptoms:   · Rest  as much as you can to help you recover  · Drink liquids as directed  to help prevent dehydration  Ask how much liquid to drink each day and which liquids are best for you  Prevent the spread of the flu:   · Wash your hands often  Use soap and water  Wash your hands after you use the bathroom, change a child's diapers, or sneeze  Wash your hands before you prepare or eat food  Use gel hand cleanser when soap and water are not available  Do not touch your eyes, nose, or mouth unless you have washed your hands first        · Cover your mouth when you sneeze or cough  Cough into a tissue or the bend of your arm  · Clean shared items with a germ-killing   Clean table surfaces, doorknobs, and light switches  Do not share towels, silverware, and dishes with people who are sick   Wash bed sheets, towels, silverware, and dishes with soap and water  · Wear a mask  over your mouth and nose if you are sick or are near anyone who is sick  · Stay away from others  if you are sick  · Influenza vaccine  helps prevent influenza (flu)  Everyone older than 6 months should get a yearly influenza vaccine  Get the vaccine as soon as it is available, usually in September or October each year  Follow up with your healthcare provider as directed:  Write down your questions so you remember to ask them during your visits  © 2017 [address] Information is for End User's use only and may not be sold, redistributed or otherwise used for commercial purposes  All illustrations and images included in CareNotes® are the copyrighted property of [employer]  or [employer]  The above information is an  only  It is not intended as medical advice for individual conditions or treatments  Talk to your doctor, nurse or pharmacist before following any medical regimen to see if it is safe and effective for you  [address]     Your healthcare provider and/or public health staff have evaluated you and have determined that you do not need to be hospitalized at this time  At this time you can be isolated at home where you will be monitored by staff from your local or state health department  You should carefully follow the prevention and isolation steps below until a healthcare provider or local or state health department says that you can return to your normal activities  Stay home except to get medical care     People who are mildly ill with COVID-19 are able to isolate at home during their illness  You should restrict activities outside your home, except for getting medical care  Do not go to work, school, or public areas  Avoid using public transportation, ride-sharing, or taxis       Separate yourself from other people and animals in your home     People: As much as possible, you should stay in a specific room and away from other people in your home  Also, you should use a separate bathroom, if available  Animals: You should restrict contact with pets and other animals while you are sick with COVID-19, just like you would around other people  Although there have not been reports of pets or other animals becoming sick with COVID-19, it is still recommended that people sick with COVID-19 limit contact with animals until more information is known about the virus  When possible, have another member of your household care for your animals while you are sick  If you are sick with COVID-19, avoid contact with your pet, including petting, snuggling, being kissed or licked, and sharing food  If you must care for your pet or be around animals while you are sick, wash your hands before and after you interact with pets and wear a facemask  See COVID-19 and Animals for more information  Call ahead before visiting your doctor     If you have a medical appointment, call the healthcare provider and tell them that you have or may have COVID-19  This will help the healthcare providers office take steps to keep other people from getting infected or exposed  Wear a facemask     You should wear a facemask when you are around other people (e g , sharing a room or vehicle) or pets and before you enter a healthcare providers office  If you are not able to wear a facemask (for example, because it causes trouble breathing), then people who live with you should not stay in the same room with you, or they should wear a facemask if they enter your room  Cover your coughs and sneezes     Cover your mouth and nose with a tissue when you cough or sneeze  Throw used tissues in a lined trash can   Immediately wash your hands with soap and water for at least 20 seconds or, if soap and water are not available, clean your hands with an alcohol-based hand  that contains at least 60% alcohol  Clean your hands often     Wash your hands often with soap and water for at least 20 seconds, especially after blowing your nose, coughing, or sneezing; going to the bathroom; and before eating or preparing food  If soap and water are not readily available, use an alcohol-based hand  with at least 60% alcohol, covering all surfaces of your hands and rubbing them together until they feel dry  Soap and water are the best option if hands are visibly dirty  Avoid touching your eyes, nose, and mouth with unwashed hands  Avoid sharing personal household items     You should not share dishes, drinking glasses, cups, eating utensils, towels, or bedding with other people or pets in your home  After using these items, they should be washed thoroughly with soap and water  Clean all high-touch surfaces everyday     High touch surfaces include counters, tabletops, doorknobs, bathroom fixtures, toilets, phones, keyboards, tablets, and bedside tables  Also, clean any surfaces that may have blood, stool, or body fluids on them  Use a household cleaning spray or wipe, according to the label instructions  Labels contain instructions for safe and effective use of the cleaning product including precautions you should take when applying the product, such as wearing gloves and making sure you have good ventilation during use of the product  Monitor your symptoms     Seek prompt medical attention if your illness is worsening (e g , difficulty breathing)  Before seeking care, call your healthcare provider and tell them that you have, or are being evaluated for, COVID-19  Put on a facemask before you enter the facility  These steps will help the healthcare providers office to keep other people in the office or waiting room from getting infected or exposed  Ask your healthcare provider to call the local or state health department   Persons who are placed under active monitoring or facilitated self-monitoring should follow instructions provided by their local health department or occupational health professionals, as appropriate  If you have a medical emergency and need to call 911, notify the dispatch personnel that you have, or are being evaluated for COVID-19  If possible, put on a facemask before emergency medical services arrive  Discontinuing home isolation     Patients with confirmed COVID-19 should remain under home isolation precautions until the risk of secondary transmission to others is thought to be low  The decision to discontinue home isolation precautions should be made on a case-by-case basis, in consultation with healthcare providers and state and local health departments       Source: [employer]        Proceed to ER if symptoms worsen

## 2021-02-26 NOTE — PROGRESS NOTES
[facility]        NAME: Basia Prescott is a 55 y o  male  : 1975    MRN: [id number]  DATE: 2021  TIME: 5:20 PM    Assessment and Plan   Flu-like symptoms [R68 89]  1  Flu-like symptoms  [facility]ov[facility]),[facility]    albuterol (ProAir HFA) 90 mcg/act inhaler         Patient Instructions     Patient Instructions     You have been diagnosed with a flu-like illness, and your symptoms should resolve over the next 7 to 10 days with the treatments recommended today  If they do not, it is possible that you have developed a bacterial infection and you should return  If you were to take an antibiotic while you are still in the viral stage, you will not get better any faster, but could kill off  good germs in your body as well as make germs  resistant to the antibiotic  Take an expectorant - guaifenesin should be the only ingredient - during the day, and the cough suppressant (ex  Robitussin DM or Tessalon) if needed at night only  Take Zinc 12 5 to 15 mg every 2 - 3 hrs while awake for the next few days  You may take Cold Ulises (13 3 mg of Zinc) or split a 25 mg Zinc tablet or lozenge in two or a 50 mg into four to get the proper dose  The total daily dose of Zinc should exceed 75 mg per day  You may also take vitamin D 3 2000 i u s per day for the next 1 week  You may also take a decongestant like Sudafed, unless you have hypertension or cardiac disease  You may take Imodium for diarrhea according to package instructions  Flu-like illness   AMBULATORY CARE:   Flu-like illness is an infection caused by a virus  The flu is easily spread when an infected person coughs, sneezes, or has close contact with others  You may be able to spread the flu to others for 1 week or longer after signs or symptoms appear     Common signs and symptoms include the following:   · Fever and chills    · Headaches, body aches, and muscle or joint pain    · Cough, runny nose, and sore throat    · Loss of appetite, nausea, vomiting, or diarrhea    · Tiredness    · Trouble breathing  Call 911 for any of the following:   · You have trouble breathing, and your lips look purple or blue  · You have a seizure  Seek care immediately if:   · You are dizzy, or you are urinating less or not at all  · You have a headache with a stiff neck, and you feel tired or confused  · You have new pain or pressure in your chest     · Your symptoms, such as shortness of breath, vomiting, or diarrhea, get worse  · Your symptoms, such as fever and coughing, seem to get better, but then get worse  Contact your healthcare provider if:   · You have new muscle pain or weakness  · You have questions or concerns about your condition or care  Treatment for influenza  may include any of the following:  · Acetaminophen  decreases pain and fever  It is available without a doctor's order  Ask how much to take and how often to take it  Follow directions  Acetaminophen can cause liver damage if not taken correctly  · NSAIDs , such as ibuprofen, help decrease swelling, pain, and fever  This medicine is available with or without a doctor's order  NSAIDs can cause stomach bleeding or kidney problems in certain people  If you take blood thinner medicine, always ask your healthcare provider if NSAIDs are safe for you  Always read the medicine label and follow directions  · Antivirals  help fight a viral infection  Manage your symptoms:   · Rest  as much as you can to help you recover  · Drink liquids as directed  to help prevent dehydration  Ask how much liquid to drink each day and which liquids are best for you  Prevent the spread of the flu:   · Wash your hands often  Use soap and water  Wash your hands after you use the bathroom, change a child's diapers, or sneeze  Wash your hands before you prepare or eat food  Use gel hand cleanser when soap and water are not available   Do not touch your eyes, nose, or mouth unless you have washed your hands first        · Cover your mouth when you sneeze or cough  Cough into a tissue or the bend of your arm  · Clean shared items with a germ-killing   Clean table surfaces, doorknobs, and light switches  Do not share towels, silverware, and dishes with people who are sick  Wash bed sheets, towels, silverware, and dishes with soap and water  · Wear a mask  over your mouth and nose if you are sick or are near anyone who is sick  · Stay away from others  if you are sick  · Influenza vaccine  helps prevent influenza (flu)  Everyone older than 6 months should get a yearly influenza vaccine  Get the vaccine as soon as it is available, usually in September or October each year  Follow up with your healthcare provider as directed:  Write down your questions so you remember to ask them during your visits  © 2017 [facility] Information is for End User's use only and may not be sold, redistributed or otherwise used for commercial purposes  All illustrations and images included in CareNotes® are the copyrighted property of A D A M [employer] Inc  or [employer]  The above information is an  only  It is not intended as medical advice for individual conditions or treatments  Talk to your doctor, nurse or pharmacist before following any medical regimen to see if it is safe and effective for you  [address]     Your healthcare provider and/or public health staff have evaluated you and have determined that you do not need to be hospitalized at this time  At this time you can be isolated at home where you will be monitored by staff from your local or state health department  You should carefully follow the prevention and isolation steps below until a healthcare provider or local or state health department says that you can return to your normal activities          Stay home except to get medical care     People who are mildly ill with COVID-19 are able to isolate at home during their illness  You should restrict activities outside your home, except for getting medical care  Do not go to work, school, or public areas  Avoid using public transportation, ride-sharing, or taxis  Separate yourself from other people and animals in your home     People: As much as possible, you should stay in a specific room and away from other people in your home  Also, you should use a separate bathroom, if available  Animals: You should restrict contact with pets and other animals while you are sick with COVID-19, just like you would around other people  Although there have not been reports of pets or other animals becoming sick with COVID-19, it is still recommended that people sick with COVID-19 limit contact with animals until more information is known about the virus  When possible, have another member of your household care for your animals while you are sick  If you are sick with COVID-19, avoid contact with your pet, including petting, snuggling, being kissed or licked, and sharing food  If you must care for your pet or be around animals while you are sick, wash your hands before and after you interact with pets and wear a facemask  See COVID-19 and Animals for more information  Call ahead before visiting your doctor     If you have a medical appointment, call the healthcare provider and tell them that you have or may have COVID-19  This will help the healthcare providers office take steps to keep other people from getting infected or exposed  Wear a facemask     You should wear a facemask when you are around other people (e g , sharing a room or vehicle) or pets and before you enter a healthcare providers office   If you are not able to wear a facemask (for example, because it causes trouble breathing), then people who live with you should not stay in the same room with you, or they should wear a facemask if they enter your room  Cover your coughs and sneezes     Cover your mouth and nose with a tissue when you cough or sneeze  Throw used tissues in a lined trash can  Immediately wash your hands with soap and water for at least 20 seconds or, if soap and water are not available, clean your hands with an alcohol-based hand  that contains at least 60% alcohol  Clean your hands often     Wash your hands often with soap and water for at least 20 seconds, especially after blowing your nose, coughing, or sneezing; going to the bathroom; and before eating or preparing food  If soap and water are not readily available, use an alcohol-based hand  with at least 60% alcohol, covering all surfaces of your hands and rubbing them together until they feel dry  Soap and water are the best option if hands are visibly dirty  Avoid touching your eyes, nose, and mouth with unwashed hands  Avoid sharing personal household items     You should not share dishes, drinking glasses, cups, eating utensils, towels, or bedding with other people or pets in your home  After using these items, they should be washed thoroughly with soap and water  Clean all high-touch surfaces everyday     High touch surfaces include counters, tabletops, doorknobs, bathroom fixtures, toilets, phones, keyboards, tablets, and bedside tables  Also, clean any surfaces that may have blood, stool, or body fluids on them  Use a household cleaning spray or wipe, according to the label instructions  Labels contain instructions for safe and effective use of the cleaning product including precautions you should take when applying the product, such as wearing gloves and making sure you have good ventilation during use of the product  Monitor your symptoms     Seek prompt medical attention if your illness is worsening (e g , difficulty breathing)   Before seeking care, call your healthcare provider and tell them that you have, or are being evaluated for, COVID-19  Put on a facemask before you enter the facility  These steps will help the healthcare providers office to keep other people in the office or waiting room from getting infected or exposed  Ask your healthcare provider to call the local or state health department  Persons who are placed under active monitoring or facilitated self-monitoring should follow instructions provided by their local health department or occupational health professionals, as appropriate  If you have a medical emergency and need to call 911, notify the dispatch personnel that you have, or are being evaluated for COVID-19  If possible, put on a facemask before emergency medical services arrive  Discontinuing home isolation     Patients with confirmed COVID-19 should remain under home isolation precautions until the risk of secondary transmission to others is thought to be low  The decision to discontinue home isolation precautions should be made on a case-by-case basis, in consultation with healthcare providers and [facility] health departments  Source: [employer]        Proceed to ER if symptoms worsen          Follow up with PCP in 3-5 days  Proceed to  ER if symptoms worsen  Chief Complaint     Chief Complaint   Patient presents with    COVID-19     headaches off and on, SOB, vomited yesterday and was sent home from work needs to be tested  History of Present Illness       Patient complains of cough, congestion, chest tightness, fatigue for the past 2 days  Review of Systems   Review of Systems   Constitutional: Positive for fatigue  Negative for chills and fever  HENT: Positive for congestion, rhinorrhea and sinus pressure  Negative for ear discharge and hearing loss  Respiratory: Positive for cough and chest tightness  Negative for shortness of breath and wheezing      Gastrointestinal: Negative for diarrhea and vomiting  Musculoskeletal: Negative for neck stiffness  Skin: Negative for pallor  Neurological: Negative for headaches  Current Medications       Current Outpatient Medications:     albuterol (ProAir HFA) 90 mcg/act inhaler, Inhale 2 puffs every 6 (six) hours as needed for shortness of breath, Disp: 8 5 g, Rfl: 0    Current Allergies     Allergies as of 02/26/2021    (No Known Allergies)            The following portions of the patient's history were reviewed and updated as appropriate: allergies, current medications, past family history, past medical history, past social history, past surgical history and problem list      Past Medical History:   Diagnosis Date    Arthritis        Past Surgical History:   Procedure Laterality Date    NO PAST SURGERIES         Family History   Problem Relation Age of Onset    Diabetes Mother     Heart disease Mother     Heart disease Father     Kidney disease Father     Cirrhosis Father          Medications have been verified  Objective   Pulse 98   Temp 97 5 °F (36 4 °C)   Resp 16   Ht 6' 1" (1 854 m)   Wt 107 kg (235 lb)   SpO2 97%   BMI 31 00 kg/m²        Physical Exam     Physical Exam  Vitals signs and nursing note reviewed  Constitutional:       General: He is not in acute distress  Appearance: He is well-developed  He is not diaphoretic  HENT:      Head: Normocephalic and atraumatic  Nose: Mucosal edema and rhinorrhea present  Mouth/Throat:      Pharynx: Posterior oropharyngeal erythema present  Eyes:      Conjunctiva/sclera: Conjunctivae normal       Pupils: Pupils are equal, round, and reactive to light  Neck:      Musculoskeletal: Neck supple  Cardiovascular:      Rate and Rhythm: Normal rate and regular rhythm  Pulmonary:      Effort: Pulmonary effort is normal  No respiratory distress  Breath sounds: Normal breath sounds  Lymphadenopathy:      Cervical: No cervical adenopathy     Skin:     General: Skin is warm and dry  Coloration: Skin is not pale  Neurological:      Mental Status: He is alert and oriented to person, place, and time  Psychiatric:         Mood and Affect: Mood normal          Behavior: Behavior normal          Thought Content:  Thought content normal          Judgment: Judgment normal

## 2021-02-26 NOTE — LETTER
February 26, 2021     Patient: Nick Nelson   YOB: 1975   Date of Visit: 2/26/2021       To Whom it May Concern:    Nick Nelson was seen in my clinic on 2/26/2021  He may return to work after 10 days symptoms free or negative test result    If you have any questions or concerns, please don't hesitate to call           Sincerely,          VARSHA LAWS        CC: No Recipients

## 2021-02-27 LAB — SARS-COV-2 RNA RESP QL NAA+PROBE: NEGATIVE

## 2021-02-28 ENCOUNTER — OFFICE VISIT (OUTPATIENT)
Dept: URGENT CARE | Facility: CLINIC | Age: 46
End: 2021-02-28
Payer: COMMERCIAL

## 2021-02-28 VITALS — WEIGHT: 235 LBS | HEIGHT: 73 IN | BODY MASS INDEX: 31.14 KG/M2

## 2021-02-28 DIAGNOSIS — B34.9 ACUTE VIRAL SYNDROME: Primary | ICD-10-CM

## 2021-02-28 PROCEDURE — G0382 LEV 3 HOSP TYPE B ED VISIT: HCPCS | Performed by: PHYSICIAN ASSISTANT

## 2021-02-28 PROCEDURE — 99283 EMERGENCY DEPT VISIT LOW MDM: CPT | Performed by: PHYSICIAN ASSISTANT

## 2021-02-28 PROCEDURE — 99213 OFFICE O/P EST LOW 20 MIN: CPT | Performed by: PHYSICIAN ASSISTANT

## 2021-02-28 PROCEDURE — 0241U HB NFCT DS VIR RESP RNA 4 TRGT: CPT | Performed by: PHYSICIAN ASSISTANT

## 2021-02-28 RX ORDER — IBUPROFEN 200 MG
800 TABLET ORAL EVERY 8 HOURS PRN
Qty: 30 TABLET | Refills: 0 | Status: SHIPPED | OUTPATIENT
Start: 2021-02-28

## 2021-02-28 NOTE — PATIENT INSTRUCTIONS
Monitor your symptoms, if symptoms worsen report to the ED  Increase oral hydration  Get plenty of rest   Take Motrin and Tylenol to reduce any fever/pain  [address]    Your healthcare provider and/or public health staff have evaluated you and have determined that you do not need to remain in the hospital at this time  At this time you can be isolated at home where you will be monitored by staff from your local or state health department  You should carefully follow the prevention and isolation steps below until a healthcare provider or local or state health department says that you can return to your normal activities  Stay home except to get medical care    People who are mildly ill with COVID-19 are able to isolate at home during their illness  You should restrict activities outside your home, except for getting medical care  Do not go to work, school, or public areas  Avoid using public transportation, ride-sharing, or taxis  Separate yourself from other people and animals in your home    People: As much as possible, you should stay in a specific room and away from other people in your home  Also, you should use a separate bathroom, if available  Animals: You should restrict contact with pets and other animals while you are sick with COVID-19, just like you would around other people  Although there have not been reports of pets or other animals becoming sick with COVID-19, it is still recommended that people sick with COVID-19 limit contact with animals until more information is known about the virus  When possible, have another member of your household care for your animals while you are sick  If you are sick with COVID-19, avoid contact with your pet, including petting, snuggling, being kissed or licked, and sharing food  If you must care for your pet or be around animals while you are sick, wash your hands before and after you interact with pets and wear a facemask   See COVID-19 and Animals for more information  Call ahead before visiting your doctor    If you have a medical appointment, call the healthcare provider and tell them that you have or may have COVID-19  This will help the healthcare providers office take steps to keep other people from getting infected or exposed  Wear a facemask    You should wear a facemask when you are around other people (e g , sharing a room or vehicle) or pets and before you enter a healthcare providers office  If you are not able to wear a facemask (for example, because it causes trouble breathing), then people who live with you should not stay in the same room with you, or they should wear a facemask if they enter your room  Cover your coughs and sneezes    Cover your mouth and nose with a tissue when you cough or sneeze  Throw used tissues in a lined trash can  Immediately wash your hands with soap and water for at least 20 seconds or, if soap and water are not available, clean your hands with an alcohol-based hand  that contains at least 60% alcohol  Clean your hands often    Wash your hands often with soap and water for at least 20 seconds, especially after blowing your nose, coughing, or sneezing; going to the bathroom; and before eating or preparing food  If soap and water are not readily available, use an alcohol-based hand  with at least 60% alcohol, covering all surfaces of your hands and rubbing them together until they feel dry  Soap and water are the best option if hands are visibly dirty  Avoid touching your eyes, nose, and mouth with unwashed hands  Avoid sharing personal household items    You should not share dishes, drinking glasses, cups, eating utensils, towels, or bedding with other people or pets in your home  After using these items, they should be washed thoroughly with soap and water      Clean all high-touch surfaces everyday    High touch surfaces include counters, tabletops, doorknobs, bathroom fixtures, toilets, phones, keyboards, tablets, and bedside tables  Also, clean any surfaces that may have blood, stool, or body fluids on them  Use a household cleaning spray or wipe, according to the label instructions  Labels contain instructions for safe and effective use of the cleaning product including precautions you should take when applying the product, such as wearing gloves and making sure you have good ventilation during use of the product  Monitor your symptoms    Seek prompt medical attention if your illness is worsening (e g , difficulty breathing)  Before seeking care, call your healthcare provider and tell them that you have, or are being evaluated for, COVID-19  Put on a facemask before you enter the facility  These steps will help the healthcare providers office to keep other people in the office or waiting room from getting infected or exposed  Ask your healthcare provider to call the local or [facility] health department  Persons who are placed under active monitoring or facilitated self-monitoring should follow instructions provided by their local health department or occupational health professionals, as appropriate  If you have a medical emergency and need to call 911, notify the dispatch personnel that you have, or are being evaluated for COVID-19  If possible, put on a facemask before emergency medical services arrive      Discontinuing home isolation    Patients with confirmed COVID-19 should remain under home isolation precautions until the following conditions are met:   - They have had no fever for at least 24 hours (that is one full day of no fever without the use medicine that reduces fevers)  AND  - other symptoms have improved (for example, when their cough or shortness of breath have improved)  AND  - If had mild or moderate illness, at least 10 days have passed since their symptoms first appeared or if severe illness (needed oxygen) or immunosuppressed, at least 20 days have passed since symptoms first appeared  Patients with confirmed COVID-19 should also notify close contacts (including their workplace) and ask that they self-quarantine  Currently, close contact is defined as being within 6 feet for 15 minutes or more from the period 24 hours starting 48 hours before symptom onset to the time at which the patient went into isolation  Close contacts of patients diagnosed with COVID-19 should be instructed by the patient to self-quarantine for 14 days from the last time of their last contact with the patient       Source: [employer]

## 2021-02-28 NOTE — LETTER
February 28, 2021     Patient: Kayden Laughlin   YOB: 1975   Date of Visit: 2/28/2021       To Whom It May Concern: It is my medical opinion that Kayden Laughlin Should remain out of work for 10 days since symptom onset, fever free without the use of medications for 24 hours, and overall improvement of symptoms  OR 10 days since last high risk exposure OR negative test results  If you have any questions or concerns, please don't hesitate to call           Sincerely,        Erma De La Garza PA-C    CC: Kayden Laughlin

## 2021-02-28 NOTE — PROGRESS NOTES
[facility]        NAME: Maurilio Avalos is a 55 y o  male  : 1975    MRN: [id number]  DATE: 2021  TIME: 11:33 AM    Assessment and Plan   Acute viral syndrome [B34 9]  1  Acute viral syndrome  Multiplex COVID19, Influenza A/B, RSV PCR, [facility] - Office Collection    ibuprofen (MOTRIN) 200 mg tablet    fluticasone (VERAMYST) 27 5 MCG/SPRAY nasal spray         Patient Instructions       Follow up with PCP in 3-5 days  Proceed to  ER if symptoms worsen  Chief Complaint     Chief Complaint   Patient presents with    COVID-19     tested for covid  negative  woke up this morning feeling worse  headache, sore throat, runny nose, cough  History of Present Illness       Patient is c/o nasal congestion, fatigue, headaches, scratchy throat and cough  Symptoms began almost 2 weeks ago  Pt symptoms have been intermittent  Pt was tested for Covid-19 two days ago, negative results  Pt was feeling better until worsening sxs this morning  No CP or SOB  URI   This is a new problem  The current episode started 1 to 4 weeks ago  The problem has been waxing and waning  There has been no fever  Associated symptoms include congestion, coughing, rhinorrhea and a sore throat  Pertinent negatives include no abdominal pain, chest pain, dysuria, ear pain, rash or vomiting  He has tried decongestant for the symptoms  Review of Systems   Review of Systems   Constitutional: Positive for fatigue  Negative for chills and fever  HENT: Positive for congestion, postnasal drip, rhinorrhea and sore throat  Negative for ear pain  Eyes: Negative for pain and visual disturbance  Respiratory: Positive for cough  Negative for shortness of breath  Cardiovascular: Negative for chest pain and palpitations  Gastrointestinal: Negative for abdominal pain and vomiting  Genitourinary: Negative for dysuria and hematuria  Musculoskeletal: Negative for arthralgias and back pain     Skin: Negative for color change and rash  Neurological: Negative for seizures and syncope  All other systems reviewed and are negative  Current Medications       Current Outpatient Medications:     albuterol (ProAir HFA) 90 mcg/act inhaler, Inhale 2 puffs every 6 (six) hours as needed for shortness of breath, Disp: 8 5 g, Rfl: 0    fluticasone (VERAMYST) 27 5 MCG/SPRAY nasal spray, 2 sprays into each nostril daily, Disp: 1 g, Rfl: 0    ibuprofen (MOTRIN) 200 mg tablet, Take 4 tablets (800 mg total) by mouth every 8 (eight) hours as needed for mild pain, Disp: 30 tablet, Rfl: 0    Current Allergies     Allergies as of 02/28/2021    (No Known Allergies)            The following portions of the patient's history were reviewed and updated as appropriate: allergies, current medications, past family history, past medical history, past social history, past surgical history and problem list      Past Medical History:   Diagnosis Date    Arthritis        Past Surgical History:   Procedure Laterality Date    NO PAST SURGERIES         Family History   Problem Relation Age of Onset    Diabetes Mother     Heart disease Mother     Heart disease Father     Kidney disease Father     Cirrhosis Father          Medications have been verified  Objective   Ht 6' 1" (1 854 m)   Wt 107 kg (235 lb)   BMI 31 00 kg/m²   No LMP for male patient  Physical Exam     Physical Exam  Constitutional:       Appearance: Normal appearance  He is normal weight  HENT:      Head: Normocephalic and atraumatic  Nose: Nose normal       Mouth/Throat:      Mouth: Mucous membranes are moist       Comments: Post nasal drainage  Eyes:      Extraocular Movements: Extraocular movements intact  Conjunctiva/sclera: Conjunctivae normal       Pupils: Pupils are equal, round, and reactive to light  Neck:      Musculoskeletal: Normal range of motion and neck supple  Cardiovascular:      Rate and Rhythm: Normal rate     Pulmonary: Effort: Pulmonary effort is normal    Musculoskeletal: Normal range of motion  Skin:     General: Skin is warm and dry  Neurological:      General: No focal deficit present  Mental Status: He is alert and oriented to person, place, and time     Psychiatric:         Mood and Affect: Mood normal          Behavior: Behavior normal

## 2021-03-01 LAB
FLUAV RNA RESP QL NAA+PROBE: NEGATIVE
FLUBV RNA RESP QL NAA+PROBE: NEGATIVE
RSV RNA RESP QL NAA+PROBE: NEGATIVE
SARS-COV-2 RNA RESP QL NAA+PROBE: NEGATIVE

## 2021-07-06 ENCOUNTER — APPOINTMENT (OUTPATIENT)
Dept: RADIOLOGY | Facility: CLINIC | Age: 46
End: 2021-07-06
Payer: COMMERCIAL

## 2021-07-06 ENCOUNTER — OFFICE VISIT (OUTPATIENT)
Dept: URGENT CARE | Facility: CLINIC | Age: 46
End: 2021-07-06
Payer: COMMERCIAL

## 2021-07-06 VITALS
HEART RATE: 94 BPM | RESPIRATION RATE: 18 BRPM | BODY MASS INDEX: 31.14 KG/M2 | TEMPERATURE: 98.7 F | SYSTOLIC BLOOD PRESSURE: 115 MMHG | OXYGEN SATURATION: 98 % | HEIGHT: 73 IN | WEIGHT: 235 LBS | DIASTOLIC BLOOD PRESSURE: 72 MMHG

## 2021-07-06 DIAGNOSIS — M20.011 MALLET FINGER, RIGHT: Primary | ICD-10-CM

## 2021-07-06 DIAGNOSIS — S69.90XA FINGER INJURY, INITIAL ENCOUNTER: ICD-10-CM

## 2021-07-06 PROCEDURE — 73140 X-RAY EXAM OF FINGER(S): CPT

## 2021-07-06 PROCEDURE — 29130 APPL FINGER SPLINT STATIC: CPT | Performed by: PHYSICIAN ASSISTANT

## 2021-07-06 PROCEDURE — 99213 OFFICE O/P EST LOW 20 MIN: CPT | Performed by: PHYSICIAN ASSISTANT

## 2021-07-06 NOTE — LETTER
July 6, 2021     Patient: Trey Oneal   YOB: 1975   Date of Visit: 7/6/2021       To Whom It May Concern: It is my medical opinion that Trey Oneal should remain out of work until cleared by hand surgeon  If you have any questions or concerns, please don't hesitate to call           Sincerely,        Christine Carmona PA-C

## 2021-07-06 NOTE — PATIENT INSTRUCTIONS
Keep finger splint applied all times  Over-the-counter Tylenol and ibuprofen for pain  Make appointment with hand surgery  Go to ER if symptoms become severe  Jammed Finger   WHAT YOU NEED TO KNOW:   A jammed finger is an injury to the tendon that straightens the tip of your finger  A piece of bone may be pulled away with your tendon  Your injury may take 4 to 8 weeks to heal   DISCHARGE INSTRUCTIONS:   Medicines: You may need any of the following:  · Acetaminophen  decreases pain  It is available without a doctor's order  Ask how much to take and how often to take it  Follow directions  Acetaminophen can cause liver damage if not taken correctly  · NSAIDs , such as ibuprofen, help decrease swelling, pain, and fever  This medicine is available with or without a doctor's order  NSAIDs can cause stomach bleeding or kidney problems in certain people  If you take blood thinner medicine, always ask if NSAIDs are safe for you  Always read the medicine label and follow directions  Do not give these medicines to children under 10months of age without direction from your child's healthcare provider  · Take your medicine as directed  Contact your healthcare provider if you think your medicine is not helping or if you have side effects  Tell him if you are allergic to any medicine  Keep a list of the medicines, vitamins, and herbs you take  Include the amounts, and when and why you take them  Bring the list or the pill bottles to follow-up visits  Carry your medicine list with you in case of an emergency  Self-care:   · Apply ice  on your finger for 15 to 20 minutes every hour or as directed  Use an ice pack, or put crushed ice in a plastic bag  Cover it with a towel  Ice helps prevent tissue damage and decreases swelling and pain  · Elevate  your hand above the level of your heart as often as you can  This will help decrease swelling and pain   Prop your hand on pillows or blankets to keep it elevated comfortably  · Immobilize  your finger  A splint will be placed on your finger to keep it straight while it heals  You may need to wear this splint for 6 to 8 weeks  You may need to continue to use the splint during sports activities for another 6 to 8 weeks  Splint care:   · You may remove the splint each day to wash your finger  · When your splint is off, do not try to bend the tip of your finger  · Put your splint back on as soon as possible  When you apply new tape, make sure the splint is in the same place and position  You may also need new tape if the splint gets wet  If your finger is numb or tingling, the splint may be too tight  Loosen the tape so your finger is comfortable  Contact your healthcare provider if:   · You have pain or swelling that is getting worse  · You have increased redness and swelling in your finger  · Your finger feels numb, tingly, or cold, even after you loosen the splint  · Your finger looks white or blue and feels cold  · You have questions or concerns about your condition or care  Return to the emergency department if:   · You have blood beneath your nail, or your nail is not fully attached  © Copyright [facility] Information is for End User's use only and may not be sold, redistributed or otherwise used for commercial purposes  All illustrations and images included in CareNotes® are the copyrighted property of [employer]  or Bernie Ramos  The above information is an  only  It is not intended as medical advice for individual conditions or treatments  Talk to your doctor, nurse or pharmacist before following any medical regimen to see if it is safe and effective for you

## 2021-07-06 NOTE — PROGRESS NOTES
[facility]        NAME: Gara Lesches is a 55 y o  male  : 1975    MRN: [id number]  DATE: 2021  TIME: 7:48 PM    Assessment and Plan   Mallet finger, right [M20 011]  1  Mallet finger, right  XR finger right fifth digit-pinkie    Ambulatory referral to Hand Surgery    Splint application         Patient Instructions   Keep finger splint applied all times  Over-the-counter Tylenol and ibuprofen for pain  Make appointment with hand surgery  Follow up with PCP in 3-5 days  Proceed to  ER if symptoms worsen  Chief Complaint     Chief Complaint   Patient presents with    Hand Pain     While pulling dog in pinkie got caught in metal ring on collar          History of Present Illness       Patient is a 26-year-old male with no significant past medical history presents to the office complaining pain, swelling, and ecchymosis to the distal aspect of his right little finger  Patient reports symptoms began after he got his finger caught in a dog collar when he suddenly pulled his hand away  Pain is rated 3/10 at rest and 6/10 with palpation  Denies any prior injuries to the finger  Review of Systems   Review of Systems   Musculoskeletal: Positive for arthralgias and joint swelling  Skin: Positive for color change  Negative for wound  Neurological: Negative for numbness           Current Medications       Current Outpatient Medications:     albuterol (ProAir HFA) 90 mcg/act inhaler, Inhale 2 puffs every 6 (six) hours as needed for shortness of breath (Patient not taking: Reported on 2021), Disp: 8 5 g, Rfl: 0    fluticasone (VERAMYST) 27 5 MCG/SPRAY nasal spray, 2 sprays into each nostril daily (Patient not taking: Reported on 2021), Disp: 1 g, Rfl: 0    ibuprofen (MOTRIN) 200 mg tablet, Take 4 tablets (800 mg total) by mouth every 8 (eight) hours as needed for mild pain (Patient not taking: Reported on 2021), Disp: 30 tablet, Rfl: 0    Current Allergies     Allergies as of 07/06/2021    (No Known Allergies)            The following portions of the patient's history were reviewed and updated as appropriate: allergies, current medications, past family history, past medical history, past social history, past surgical history and problem list      Past Medical History:   Diagnosis Date    Arthritis        Past Surgical History:   Procedure Laterality Date    NO PAST SURGERIES         Family History   Problem Relation Age of Onset    Diabetes Mother     Heart disease Mother     Heart disease Father     Kidney disease Father     Cirrhosis Father          Medications have been verified  Objective   /72   Pulse 94   Temp 98 7 °F (37 1 °C)   Resp 18   Ht 6' 1" (1 854 m)   Wt 107 kg (235 lb)   SpO2 98%   BMI 31 00 kg/m²   No LMP for male patient  Physical Exam     Physical Exam  Vitals and nursing note reviewed  Constitutional:       Appearance: He is well-developed  HENT:      Head: Normocephalic and atraumatic  Right Ear: External ear normal       Left Ear: External ear normal       Nose: Nose normal    Eyes:      General: Lids are normal       Conjunctiva/sclera: Conjunctivae normal    Musculoskeletal:      Comments: Right little finger:  Swelling and ecchymosis to the distal phalanx  Tenderness to palpation of DIP and distal phalanx  Unable to fully extend distal phalanx  FDP and FDS intact  Neurovascular intact  Sensation intact to gross touch  Skin:     General: Skin is warm and dry  Capillary Refill: Capillary refill takes less than 2 seconds  Neurological:      Mental Status: He is alert  Right 5th finger x-ray:  Avulsion fracture to the dorsal proximal end of the distal phalanx  Radiology interpretation pending  Splint application    Date/Time: 7/6/2021 7:47 PM  Performed by: Geoffrey Payne PA-C  Authorized by: Geoffrey Payne PA-C   Universal Protocol:  Consent: Verbal consent obtained    Risks and benefits: risks, benefits and alternatives were discussed  Consent given by: patient  Time out: Immediately prior to procedure a "time out" was called to verify the correct patient, procedure, equipment, support staff and site/side marked as required  Timeout called at: 7/6/2021 7:05 PM   Patient understanding: patient states understanding of the procedure being performed  Patient consent: the patient's understanding of the procedure matches consent given      Pre-procedure details:     Sensation:  Normal  Procedure details:     Laterality:  Right    Location:  Finger    Finger:  R small finger    Splint type:  Finger splint, static  Post-procedure details:     Pain:  Unchanged    Sensation:  Normal    Patient tolerance of procedure:   Tolerated well, no immediate complications

## 2021-07-09 ENCOUNTER — OFFICE VISIT (OUTPATIENT)
Dept: OBGYN CLINIC | Facility: CLINIC | Age: 46
End: 2021-07-09
Payer: COMMERCIAL

## 2021-07-09 ENCOUNTER — HOSPITAL ENCOUNTER (OUTPATIENT)
Dept: RADIOLOGY | Facility: CLINIC | Age: 46
Discharge: HOME | End: 2021-07-09
Payer: COMMERCIAL

## 2021-07-09 VITALS
DIASTOLIC BLOOD PRESSURE: 88 MMHG | WEIGHT: 235 LBS | BODY MASS INDEX: 31.14 KG/M2 | HEART RATE: 64 BPM | TEMPERATURE: 97.5 F | SYSTOLIC BLOOD PRESSURE: 142 MMHG | HEIGHT: 73 IN

## 2021-07-09 DIAGNOSIS — M20.011 MALLET FINGER, RIGHT: ICD-10-CM

## 2021-07-09 DIAGNOSIS — M79.644 FINGER PAIN, RIGHT: Primary | ICD-10-CM

## 2021-07-09 PROCEDURE — 73140 X-RAY EXAM OF FINGER(S): CPT

## 2021-07-09 PROCEDURE — 99203 OFFICE O/P NEW LOW 30 MIN: CPT | Performed by: ORTHOPAEDIC SURGERY

## 2021-07-09 NOTE — PROGRESS NOTES
ASSESSMENT/PLAN:    Diagnoses and all orders for this visit:    Finger pain, right    Mallet finger, right  -     Ambulatory referral to Hand Surgery  -     XR finger right fifth digit-pinkie; Future        Plan:  I would recommend follow-up in 3 weeks  The Stax splint is to remain in place at all times  Tape may be changed with precautions as reviewed  Work restrictions were discussed and a note provided  He is to contact me if any questions or concerns arise prior to follow-up  X-rays will be obtained in his Stax splint upon return to ensure adequate progression of healing to allow early range of motion  Return in about 3 weeks (around 7/30/2021)  _____________________________________________________  CHIEF COMPLAINT:  Chief Complaint   Patient presents with    Right Little Finger - Fracture         SUBJECTIVE:  Ewelina Guerra is a 55y o  year old right-hand-dominant male who presents for evaluation of his right small finger injured on 07/04/2021 while walking his dog  The dog pulled, his small finger got caught in the loop of the leash and was injured  He did not seek immediate medical care, noted increasing pain when he got home from work on Tuesday after working overnight Monday  He was seen at the [facility] in [facility], x-rays were obtained and he was placed into an Alumafoam splint  He now presents for orthopedic evaluation and treatment  He denies any prior history of injuries  He denies paresthesias  He denies additional injuries  He has been maintaining the splint full-time since being seen at [facility]  He was not permitted to return to work until evaluated by myself  He states that work has contacted him and has limited duty available if he is able to return        PAST MEDICAL HISTORY:  Past Medical History:   Diagnosis Date    Arthritis        PAST SURGICAL HISTORY:  Past Surgical History:   Procedure Laterality Date    NO PAST SURGERIES         FAMILY HISTORY:  Family History   Problem Relation Age of Onset    Diabetes Mother     Heart disease Mother     Heart disease Father     Kidney disease Father     Cirrhosis Father        SOCIAL HISTORY:  Social History     Tobacco Use    Smoking status: Former Smoker     Packs/day: 0 50     Years: 4 00     Pack years: 2 00     Types: Cigarettes     Quit date:      Years since quittin 5    Smokeless tobacco: Current User     Types: Chew   Vaping Use    Vaping Use: Never used   Substance Use Topics    Alcohol use: No    Drug use: No       MEDICATIONS:    Current Outpatient Medications:     ibuprofen (MOTRIN) 200 mg tablet, Take 4 tablets (800 mg total) by mouth every 8 (eight) hours as needed for mild pain (Patient taking differently: Take 400 mg by mouth every 8 (eight) hours as needed for mild pain ), Disp: 30 tablet, Rfl: 0    albuterol (ProAir HFA) 90 mcg/act inhaler, Inhale 2 puffs every 6 (six) hours as needed for shortness of breath (Patient not taking: Reported on 2021), Disp: 8 5 g, Rfl: 0    fluticasone (VERAMYST) 27 5 MCG/SPRAY nasal spray, 2 sprays into each nostril daily (Patient not taking: Reported on 2021), Disp: 1 g, Rfl: 0    ALLERGIES:  No Known Allergies    Review of systems:   Constitutional: Negative for fatigue, fever or loss of apetite  HENT: Negative  Respiratory: Negative for shortness of breath, dyspnea  Cardiovascular: Negative for chest pain/tightness  Gastrointestinal: Negative for abdominal pain, N/V  Endocrine: Negative for cold/heat intolerance, unexplained weight loss/gain  Genitourinary: Negative for flank pain, dysuria, hematuria  Musculoskeletal:  Positive as in the HPI   Skin: Negative for rash  Neurological:  Negative  Psychiatric/Behavioral: Negative for agitation  _____________________________________________________  PHYSICAL EXAMINATION:    Blood pressure 142/88, pulse 64, temperature 97 5 °F (36 4 °C), height 6' 1" (1 854 m), weight 107 kg (235 lb)      General: well developed and well nourished, alert, oriented times 3 and appears comfortable  Psychiatric: Normal  HEENT: Benign  Cardiovascular: Regular    Pulmonary: No wheezing or stridor  Abdomen: Soft, Nontender  Skin: No masses, erythema, lacerations, fluctation, ulcerations  Neurovascular: Motor and sensory exams are grossly intact except for as limited by his injury  Pulses are palpable  Good color and capillary refill is noted  MUSCULOSKELETAL EXAMINATION:    The right small finger is immobilized with an Alumafoam splint extending the entire length of the small finger  The remainder of the right upper extremity examination is benign  He has no tenderness over the forearm, wrist, hand or thumb and index, psis ring and long fingers  The splint was removed from the small finger  He has a slight flexion droop at the D IP joint  There is no angular or rotational malalignment appreciated  There is some swelling and redness over the dorsal aspect of the D IP joint  He has tenderness at the proximal aspect of the distal phalanx and the D IP joint  He has limited strength of resisted extension  He has no complaints over the middle or proximal phalanges of the small finger  _____________________________________________________  STUDIES REVIEWED:  X-rays demonstrate a bony mallet finger with slight displacement of the dorsal fragment without subluxation of the distal phalanx on the middle phalanx  The x-ray report was reviewed  The [facility] report was reviewed  X-rays obtained in the stack splint after application demonstrate the fracture to be in better position with maintenance of DIP joint alignment        Fred Choi

## 2021-07-09 NOTE — LETTER
July 9, 2021     Patient: Mica Masters   YOB: 1975   Date of Visit: 7/9/2021       To Whom it May Concern:    Mica Masters is under my professional care  He was seen in my office on 7/9/2021  He may return to work on 07/11/2021  He is permitted to use the left upper extremity without restriction  He must be permitted to wear the splint right small finger and is not permitted lifting more than 5 lb with the right upper extremity  These restrictions remain in effect until he is rechecked in 3 weeks  If you have any questions or concerns, please don't hesitate to call           Sincerely,          Venkat Willson        CC: No Recipients

## 2021-07-30 ENCOUNTER — HOSPITAL ENCOUNTER (OUTPATIENT)
Dept: RADIOLOGY | Facility: CLINIC | Age: 46
Discharge: HOME | End: 2021-07-30
Payer: COMMERCIAL

## 2021-07-30 ENCOUNTER — OFFICE VISIT (OUTPATIENT)
Dept: OBGYN CLINIC | Facility: CLINIC | Age: 46
End: 2021-07-30
Payer: COMMERCIAL

## 2021-07-30 VITALS
SYSTOLIC BLOOD PRESSURE: 110 MMHG | HEIGHT: 73 IN | HEART RATE: 74 BPM | BODY MASS INDEX: 31.14 KG/M2 | TEMPERATURE: 98.2 F | WEIGHT: 235 LBS | DIASTOLIC BLOOD PRESSURE: 70 MMHG

## 2021-07-30 DIAGNOSIS — M20.011 MALLET FINGER, RIGHT: ICD-10-CM

## 2021-07-30 DIAGNOSIS — M20.011 MALLET FINGER, RIGHT: Primary | ICD-10-CM

## 2021-07-30 PROCEDURE — 99213 OFFICE O/P EST LOW 20 MIN: CPT | Performed by: ORTHOPAEDIC SURGERY

## 2021-07-30 PROCEDURE — 73140 X-RAY EXAM OF FINGER(S): CPT

## 2021-07-30 NOTE — LETTER
July 30, 2021     Patient: Mica Masters   YOB: 1975   Date of Visit: 7/30/2021       To Whom it May Concern:    Mica Masters is under my professional care  He was seen in my office on 7/30/2021  He may return to work on 07/30/2021  He must be permitted to wear the Stax splint right small finger, is not permitted to lift more than 5 lb with the right upper extremity  He may use the left upper extremity without limits  These restrictions remain in effect for the next 3 weeks until he is rechecked       If you have any questions or concerns, please don't hesitate to call           Sincerely,          Venkat Willson        CC: No Recipients

## 2021-07-30 NOTE — PROGRESS NOTES
ASSESSMENT/PLAN:    Diagnoses and all orders for this visit:    Mallet finger, right  -     XR finger right fifth digit-pinkie; Future        Plan:  I would recommend follow-up in 3 weeks  He is to continue with the Stax splint full-time, removed only for cleansing with precautions as reviewed  He is to contact me if questions or concerns arise  A note was provided for limited duty at work  Return in about 3 weeks (around 2021)  _____________________________________________________  CHIEF COMPLAINT:  Chief Complaint   Patient presents with    Right Little Finger - Follow-up         SUBJECTIVE:  Sidney Gamino is a 55y o  year old male who presents for follow up of his right small finger distal phalangeal fracture (bony mallet)  Notes some persistent pain  He has noted a little bit of skin irritation      PAST MEDICAL HISTORY:  Past Medical History:   Diagnosis Date    Arthritis        PAST SURGICAL HISTORY:  Past Surgical History:   Procedure Laterality Date    NO PAST SURGERIES         FAMILY HISTORY:  Family History   Problem Relation Age of Onset    Diabetes Mother     Heart disease Mother     Heart disease Father     Kidney disease Father     Cirrhosis Father        SOCIAL HISTORY:  Social History     Tobacco Use    Smoking status: Former Smoker     Packs/day: 0 50     Years: 4 00     Pack years: 2 00     Types: Cigarettes     Quit date:      Years since quittin 5    Smokeless tobacco: Current User     Types: Chew   Vaping Use    Vaping Use: Never used   Substance Use Topics    Alcohol use: No    Drug use: No       MEDICATIONS:    Current Outpatient Medications:     albuterol (ProAir HFA) 90 mcg/act inhaler, Inhale 2 puffs every 6 (six) hours as needed for shortness of breath (Patient not taking: Reported on 2021), Disp: 8 5 g, Rfl: 0    fluticasone (VERAMYST) 27 5 MCG/SPRAY nasal spray, 2 sprays into each nostril daily (Patient not taking: Reported on 2021), Disp: 1 g, Rfl: 0    ibuprofen (MOTRIN) 200 mg tablet, Take 4 tablets (800 mg total) by mouth every 8 (eight) hours as needed for mild pain (Patient not taking: Reported on 7/30/2021), Disp: 30 tablet, Rfl: 0    ALLERGIES:  No Known Allergies    REVIEW OF SYSTEMS:  Pertinent items are noted in HPI  A comprehensive review of systems was negative       _____________________________________________________  PHYSICAL EXAMINATION:  General: alert, oriented times 3 and appears comfortable  Psychiatric: Normal  HEENT:  Normocephalic, atraumatic  Cardiovascular:  Regular  Pulmonary: No wheezing or stridor  Skin: No masses, erthema, lacerations, fluctation, ulcerations  Neurovascular: Motor and sensory exam is grossly intact and pulses palpable  MUSCULOSKELETAL EXAMINATION:    The right small finger distal phalanx is immobilized in a Stax splint  This was removed without difficulty  He has full extension of the D IP joint  He has tenderness over the D IP joint  Skin is intact  There is good color and capillary refill     _____________________________________________________  STUDIES REVIEWED:  X-rays demonstrate the fracture to be acceptably aligned and no evidence of distal phalanx subluxation on the middle phalanx                Su Fernando

## 2021-08-20 ENCOUNTER — HOSPITAL ENCOUNTER (OUTPATIENT)
Dept: RADIOLOGY | Facility: CLINIC | Age: 46
Discharge: HOME | End: 2021-08-20
Payer: COMMERCIAL

## 2021-08-20 ENCOUNTER — TELEPHONE (OUTPATIENT)
Dept: OBGYN CLINIC | Facility: OTHER | Age: 46
End: 2021-08-20

## 2021-08-20 ENCOUNTER — OFFICE VISIT (OUTPATIENT)
Dept: OBGYN CLINIC | Facility: CLINIC | Age: 46
End: 2021-08-20
Payer: COMMERCIAL

## 2021-08-20 VITALS
HEART RATE: 75 BPM | WEIGHT: 235 LBS | TEMPERATURE: 98.3 F | HEIGHT: 73 IN | BODY MASS INDEX: 31.14 KG/M2 | DIASTOLIC BLOOD PRESSURE: 78 MMHG | SYSTOLIC BLOOD PRESSURE: 120 MMHG

## 2021-08-20 DIAGNOSIS — M20.011 MALLET FINGER, RIGHT: ICD-10-CM

## 2021-08-20 DIAGNOSIS — M20.011 MALLET FINGER, RIGHT: Primary | ICD-10-CM

## 2021-08-20 PROCEDURE — 99214 OFFICE O/P EST MOD 30 MIN: CPT | Performed by: ORTHOPAEDIC SURGERY

## 2021-08-20 PROCEDURE — 73140 X-RAY EXAM OF FINGER(S): CPT

## 2021-08-20 NOTE — TELEPHONE ENCOUNTER
After seven weeks we have gone from 5 pound restriction to a 10 pound restriction  Assuming everything heals correctly, in two weeks with the follow up, what is your expectation  He is saying this seems a little too long for him  [employer] his employer says that patient told his employer he was already cleared to return full duty  He is an  on 3rd Shift, typically he can / should be able to lift 100lbs      C/b # [id number] , Sid Mina , Employer

## 2021-08-20 NOTE — LETTER
August 20, 2021     Patient: Jace Mendez   YOB: 1975   Date of Visit: 8/20/2021       To Whom it May Concern:    Jace Mendez is under my professional care  He was seen in my office on 8/20/2021  He may return to work on 8/20/2021  He must be permitted to wear the Stax splint right small finger, is not permitted to lift more than 10 lb with the right upper extremity  He may use the left upper extremity without limits  These restrictions remain in effect for the next 2 weeks until he is rechecked       If you have any questions or concerns, please don't hesitate to call           Sincerely,          Jose Miguel Ly        CC: No Recipients

## 2021-08-20 NOTE — PROGRESS NOTES
ASSESSMENT/PLAN:    Diagnoses and all orders for this visit:    Mallet finger, right  -     XR finger right fifth digit-pinkie; Future        X-rays performed today in clinic were reviewed with the patient  He will continue light duty at work  He may remove the Stax splint for cleansing and sleeping at night  He may also remove it to work on range of motion  We will see him back in 2 weeks for recheck  X-rays will be obtained only if clinically indicated  He was encouraged to contact the office should questions or concerns arise  Return in about 2 weeks (around 9/3/2021)  _____________________________________________________  CHIEF COMPLAINT:  Chief Complaint   Patient presents with    Right Little Finger - Follow-up         SUBJECTIVE:  Amadeo Silva is a 55 y o  male who presents for follow up right 5th digit bony mallet  He is doing well  He has been wearing the Stax splint as instructed  He denies any pain with palpation to the DIP but does report pain if he hits the finger off an object         PAST MEDICAL HISTORY:  Past Medical History:   Diagnosis Date    Arthritis        PAST SURGICAL HISTORY:  Past Surgical History:   Procedure Laterality Date    NO PAST SURGERIES         FAMILY HISTORY:  Family History   Problem Relation Age of Onset    Diabetes Mother     Heart disease Mother     Heart disease Father     Kidney disease Father     Cirrhosis Father        SOCIAL HISTORY:  Social History     Tobacco Use    Smoking status: Former Smoker     Packs/day: 0 50     Years: 4 00     Pack years: 2 00     Types: Cigarettes     Quit date:      Years since quittin 6    Smokeless tobacco: Current User     Types: Chew   Vaping Use    Vaping Use: Never used   Substance Use Topics    Alcohol use: No    Drug use: No       MEDICATIONS:    Current Outpatient Medications:     albuterol (ProAir HFA) 90 mcg/act inhaler, Inhale 2 puffs every 6 (six) hours as needed for shortness of breath (Patient not taking: Reported on 8/20/2021), Disp: 8 5 g, Rfl: 0    fluticasone (VERAMYST) 27 5 MCG/SPRAY nasal spray, 2 sprays into each nostril daily (Patient not taking: Reported on 8/20/2021), Disp: 1 g, Rfl: 0    ibuprofen (MOTRIN) 200 mg tablet, Take 4 tablets (800 mg total) by mouth every 8 (eight) hours as needed for mild pain (Patient not taking: Reported on 8/20/2021), Disp: 30 tablet, Rfl: 0    ALLERGIES:  No Known Allergies    REVIEW OF SYSTEMS:  Pertinent items are noted in HPI  A comprehensive review of systems was negative   ____________________________________________________  PHYSICAL EXAMINATION:  General: well developed and well nourished, alert, oriented times 3 and appears comfortable  Psychiatric: Normal  HEENT:  Normocephalic, atraumatic  Cardiovascular:  Regular  Pulmonary: No wheezing or stridor  Skin: No masses, erthema, lacerations, fluctation, ulcerations  Neurovascular: Motor and sensory exams are grossly intact  +2 Radial pulse  Limb is warm and well perfused with good color and capillary refill of the digits  MUSCULOSKELETAL EXAMINATION:      The right 5th digit demonstrates a stack splint in place upon arrival   This was removed without difficulty  He has no tenderness to palpation the DIP  He has full DIP extension  Flexion of the DIP is limited secondary to prolonged immobilization  The remainder of the right upper extremity exam is benign  _____________________________________________________  STUDIES REVIEWED:  I have personally reviewed pertinent films and reports in PACS and my interpretation is as follows:      XRs of the right fifth digit demonstrates bony mallet of the DIP  with routine fracture healing and without interval changes in alignment  PROCEDURES PERFORMED:   Procedures  None performed today            Vero Haas PA-C

## 2021-08-23 NOTE — TELEPHONE ENCOUNTER
Spoke to Rajinder Zapien  Advised that we need to speak to patient direct  We do not have written consent to speak to employer at this time  Rajinder Zapien stated he will let the patient know and have him call back  Please let nurse know when patient calls back

## 2021-08-23 NOTE — TELEPHONE ENCOUNTER
Good Morning Gaxiola Spoon, any chance you would be able to relay Dr Oh Salguero message to patient    Thank you

## 2021-08-23 NOTE — TELEPHONE ENCOUNTER
Patients employer called back and he is asking if he can receive a call back before 2:15 pm today, he will pull Rhys Sanford off the floor to speak about this  If the office wants to send him a medical release form to the email listed below  CB # [id number]  [email or web address]

## 2021-08-23 NOTE — TELEPHONE ENCOUNTER
Spoke to patient directly  He stated his employer wants to know if he can return to full duty  Patient stated that at his appointment on 8/20 he was told if he feels like he can go back to full duty, our office will release him to do so if he calls and lets us know  Patient stated his job needs help on 3rd shift so he is willing to try going back to full duty if he can be released  He is asking for call back from Dr Kasey Cook directly  Did advise above message from Dr Kasey Cook so he is aware this will likely not be prior to the end of the business day  Understanding was verbalized

## 2021-08-23 NOTE — TELEPHONE ENCOUNTER
Patient would need to come in to the office to sign the release, correct? I dont believe this can be emailed  Please advise

## 2021-08-24 NOTE — TELEPHONE ENCOUNTER
Spoke to patient  Advised above message from Dr Jose Guadalupe Troncoso  He verbalized understanding  Restrictions will remain as they are until follow up and reevaluation

## 2021-09-03 ENCOUNTER — OFFICE VISIT (OUTPATIENT)
Dept: OBGYN CLINIC | Facility: CLINIC | Age: 46
End: 2021-09-03
Payer: COMMERCIAL

## 2021-09-03 VITALS
TEMPERATURE: 97.6 F | WEIGHT: 235 LBS | BODY MASS INDEX: 31.14 KG/M2 | HEIGHT: 73 IN | DIASTOLIC BLOOD PRESSURE: 82 MMHG | SYSTOLIC BLOOD PRESSURE: 122 MMHG | HEART RATE: 72 BPM

## 2021-09-03 DIAGNOSIS — M20.011 MALLET FINGER, RIGHT: Primary | ICD-10-CM

## 2021-09-03 PROCEDURE — 99213 OFFICE O/P EST LOW 20 MIN: CPT | Performed by: ORTHOPAEDIC SURGERY

## 2021-09-03 NOTE — PROGRESS NOTES
ASSESSMENT/PLAN:    Diagnoses and all orders for this visit:    Mallet finger, right        Nestor Hodge was encouraged to continue working on the DIP flexion  He was shown exercises to help improve his flexion  He may return to work full duty and was provided a note to this effect  He may discontinue use of the Stax splint  We will see him back on an as-needed basis  He was advised to give us a call in about 1 month if he continues to struggle with DIP flexion  Return if symptoms worsen or fail to improve       _____________________________________________________  CHIEF COMPLAINT:  Chief Complaint   Patient presents with    Right Little Finger - Follow-up         SUBJECTIVE:  Gara Lesches is a 55 y o  male who presents for follow up   Of right 5th digit bony mallet  He is doing well overall  He denies any pain  He reports continued stiffness with the IP flexion  He has been working in a limited duty capacity        PAST MEDICAL HISTORY:  Past Medical History:   Diagnosis Date    Arthritis        PAST SURGICAL HISTORY:  Past Surgical History:   Procedure Laterality Date    NO PAST SURGERIES         FAMILY HISTORY:  Family History   Problem Relation Age of Onset    Diabetes Mother     Heart disease Mother     Heart disease Father     Kidney disease Father     Cirrhosis Father        SOCIAL HISTORY:  Social History     Tobacco Use    Smoking status: Former Smoker     Packs/day: 0 50     Years: 4 00     Pack years: 2 00     Types: Cigarettes     Quit date:      Years since quittin 6    Smokeless tobacco: Current User     Types: Chew   Vaping Use    Vaping Use: Never used   Substance Use Topics    Alcohol use: No    Drug use: No       MEDICATIONS:    Current Outpatient Medications:     albuterol (ProAir HFA) 90 mcg/act inhaler, Inhale 2 puffs every 6 (six) hours as needed for shortness of breath (Patient not taking: Reported on 2021), Disp: 8 5 g, Rfl: 0    fluticasone (VERAMYST) 27 5 MCG/SPRAY nasal spray, 2 sprays into each nostril daily (Patient not taking: Reported on 8/20/2021), Disp: 1 g, Rfl: 0    ibuprofen (MOTRIN) 200 mg tablet, Take 4 tablets (800 mg total) by mouth every 8 (eight) hours as needed for mild pain (Patient not taking: Reported on 8/20/2021), Disp: 30 tablet, Rfl: 0    ALLERGIES:  No Known Allergies    REVIEW OF SYSTEMS:  Pertinent items are noted in HPI  A comprehensive review of systems was negative       _____________________________________________________  PHYSICAL EXAMINATION:  General: well developed and well nourished, alert, oriented times 3 and appears comfortable  Psychiatric: Normal  HEENT:  Normocephalic, atraumatic  Cardiovascular:  Regular  Pulmonary: No wheezing or stridor  Skin: No masses, erthema, lacerations, fluctation, ulcerations  Neurovascular: Motor and sensory exams are grossly intact, 2+ radial pulse  Limb is warm well perfused with good color and capillary refill of the digits  MUSCULOSKELETAL EXAMINATION:      The right 5th digit demonstrates the stack splint to be in place upon arrival this was removed without difficulty  Skin is intact without erythema or ecchymosis  Mild residual swelling as to be expected post injury  No tenderness to palpation  Full extension with limited D IP flexion secondary to prolonged immobilization  Passive motion of the DIP causes complaints of stiffness and soreness  The remainder of the right upper extremity exam is benign  _____________________________________________________  STUDIES REVIEWED:  No new imaging performed today    PROCEDURES PERFORMED:   Procedures  None performed today            Vero Haas PA-C

## 2021-09-03 NOTE — LETTER
September 3, 2021     Patient: Quang Wheeler   YOB: 1975   Date of Visit: 9/3/2021       To Whom it May Concern:    Quang Wheeler is under my professional care  He was seen in my office on 9/3/2021  He may return to work without restrictions starting 9/6/21  If you have any questions or concerns, please don't hesitate to call           Sincerely,          Valente Liu        CC: No Recipients

## 2022-11-08 ENCOUNTER — OFFICE VISIT (OUTPATIENT)
Dept: URGENT CARE | Facility: CLINIC | Age: 47
End: 2022-11-08

## 2022-11-08 VITALS
BODY MASS INDEX: 31.14 KG/M2 | WEIGHT: 235 LBS | DIASTOLIC BLOOD PRESSURE: 96 MMHG | TEMPERATURE: 97.6 F | OXYGEN SATURATION: 97 % | HEIGHT: 73 IN | RESPIRATION RATE: 18 BRPM | SYSTOLIC BLOOD PRESSURE: 126 MMHG | HEART RATE: 72 BPM

## 2022-11-08 DIAGNOSIS — R68.89 FLU-LIKE SYMPTOMS: Primary | ICD-10-CM

## 2022-11-08 LAB
SARS-COV-2 AG UPPER RESP QL IA: NEGATIVE
VALID CONTROL: NORMAL

## 2022-11-08 RX ORDER — OMEPRAZOLE 20 MG/1
CAPSULE, DELAYED RELEASE ORAL
COMMUNITY
Start: 2022-10-12

## 2022-11-08 RX ORDER — CEPHALEXIN 500 MG/1
CAPSULE ORAL
COMMUNITY
Start: 2022-11-05

## 2022-11-08 RX ORDER — CELECOXIB 200 MG/1
CAPSULE ORAL
COMMUNITY
Start: 2022-10-12

## 2022-11-08 NOTE — LETTER
November 8, 2022     Patient: Callie Monahan   YOB: 1975   Date of Visit: 11/8/2022       To Whom It May Concern: It is my medical opinion that Callie Monahan should remain out of work until symptoms improve               Sincerely,        Aldo Brooks PA-C

## 2022-11-08 NOTE — PROGRESS NOTES
[id number]        NAME: Arabella Russell is a 52 y o  male  : 1975    MRN: [id number]  DATE: 2022  TIME: 5:25 PM    Assessment and Plan   Flu-like symptoms [R68 89]  1  Flu-like symptoms  Poct Covid 19 Rapid Antigen Test         Patient Instructions   Over-the-counter cold or flu  Drink plenty of fluids  Be sure to follow up with your PCP if you continue to feel ill for further evaluation  Follow up with PCP in 3-5 days  Proceed to  ER if symptoms worsen  Chief Complaint     Chief Complaint   Patient presents with   • Cold Like Symptoms     Dry heaving, headache, cough, sneezing, starting  night  Headache, body aches this morning worse  History of Present Illness       Patient is a 49-year-old male with significant past medical history of Lyme disease presents the office complaining subjective fevers and chills, headache, body aches, "foggy head", sneezing, mild cough, and a few episodes of nausea and vomiting for approximately 2 weeks  Patient's biggest complaint is the body aches  Denies chest pain, shortness the breath, or rashes  He thought it was a reactivation of his Lyme disease and saw his PCP  He had blood work performed which was normal   States his significant other is now currently sick with similar symptoms  He is concerned that he may now be sick with the flu or COVID  Review of Systems   Review of Systems   Constitutional: Positive for chills, fatigue and fever  HENT: Positive for sneezing  Negative for congestion, postnasal drip, rhinorrhea and sore throat  Respiratory: Positive for cough  Negative for shortness of breath  Cardiovascular: Negative for chest pain and palpitations  Gastrointestinal: Positive for nausea and vomiting  Negative for abdominal pain and diarrhea  Musculoskeletal: Positive for myalgias  Skin: Negative for rash  Neurological: Positive for headaches  Negative for dizziness and light-headedness  Psychiatric/Behavioral: Positive for confusion ("foggy brain")  Current Medications       Current Outpatient Medications:   •  albuterol (ProAir HFA) 90 mcg/act inhaler, Inhale 2 puffs every 6 (six) hours as needed for shortness of breath, Disp: 8 5 g, Rfl: 0  •  celecoxib (CeleBREX) 200 mg capsule, , Disp: , Rfl:   •  cephalexin (KEFLEX) 500 mg capsule, TAKE 1 CAPSULE BY MOUTH 4 TIMES DAILY FOR 10 DAYS, Disp: , Rfl:   •  ibuprofen (MOTRIN) 200 mg tablet, Take 4 tablets (800 mg total) by mouth every 8 (eight) hours as needed for mild pain, Disp: 30 tablet, Rfl: 0  •  omeprazole (PriLOSEC) 20 mg delayed release capsule, , Disp: , Rfl:   •  fluticasone (VERAMYST) 27 5 MCG/SPRAY nasal spray, 2 sprays into each nostril daily (Patient not taking: No sig reported), Disp: 1 g, Rfl: 0    Current Allergies     Allergies as of 11/08/2022 - Reviewed 11/08/2022   Allergen Reaction Noted   • Doxepin Other (See Comments) 01/05/2022            The following portions of the patient's history were reviewed and updated as appropriate: allergies, current medications, past family history, past medical history, past social history, past surgical history and problem list      Past Medical History:   Diagnosis Date   • Arthritis        Past Surgical History:   Procedure Laterality Date   • NO PAST SURGERIES         Family History   Problem Relation Age of Onset   • Diabetes Mother    • Heart disease Mother    • Heart disease Father    • Kidney disease Father    • Cirrhosis Father          Medications have been verified  Objective   /96   Pulse 72   Temp 97 6 °F (36 4 °C)   Resp 18   Ht 6' 1" (1 854 m)   Wt 107 kg (235 lb)   SpO2 97%   BMI 31 00 kg/m²   No LMP for male patient  Physical Exam     Physical Exam  Vitals and nursing note reviewed  Constitutional:       Appearance: Normal appearance  He is well-developed  HENT:      Head: Normocephalic and atraumatic        Right Ear: Tympanic membrane, ear canal and external ear normal       Left Ear: Tympanic membrane, ear canal and external ear normal       Nose: Nose normal       Mouth/Throat:      Pharynx: Uvula midline  Eyes:      General: Lids are normal       Conjunctiva/sclera: Conjunctivae normal       Pupils: Pupils are equal, round, and reactive to light  Cardiovascular:      Rate and Rhythm: Normal rate and regular rhythm  Heart sounds: Normal heart sounds  No murmur heard  No friction rub  No gallop  Pulmonary:      Effort: Pulmonary effort is normal       Breath sounds: Normal breath sounds  No stridor  No wheezing or rales  Musculoskeletal:         General: Normal range of motion  Cervical back: Neck supple  Skin:     General: Skin is warm and dry  Capillary Refill: Capillary refill takes less than 2 seconds  Neurological:      Mental Status: He is alert         POC rapid COVID-19 negative